# Patient Record
Sex: MALE | Race: WHITE | NOT HISPANIC OR LATINO
[De-identification: names, ages, dates, MRNs, and addresses within clinical notes are randomized per-mention and may not be internally consistent; named-entity substitution may affect disease eponyms.]

---

## 2017-04-12 ENCOUNTER — APPOINTMENT (OUTPATIENT)
Dept: UROLOGY | Facility: CLINIC | Age: 67
End: 2017-04-12

## 2017-04-12 VITALS
TEMPERATURE: 97.6 F | RESPIRATION RATE: 17 BRPM | HEART RATE: 55 BPM | HEIGHT: 69 IN | BODY MASS INDEX: 27.99 KG/M2 | DIASTOLIC BLOOD PRESSURE: 78 MMHG | WEIGHT: 189 LBS | SYSTOLIC BLOOD PRESSURE: 128 MMHG

## 2017-04-18 ENCOUNTER — RX RENEWAL (OUTPATIENT)
Age: 67
End: 2017-04-18

## 2017-04-21 LAB — PSA SERPL-MCNC: 7.86 NG/ML

## 2017-06-13 ENCOUNTER — APPOINTMENT (OUTPATIENT)
Dept: INTERNAL MEDICINE | Facility: CLINIC | Age: 67
End: 2017-06-13

## 2017-06-13 VITALS
HEIGHT: 69 IN | RESPIRATION RATE: 17 BRPM | OXYGEN SATURATION: 96 % | DIASTOLIC BLOOD PRESSURE: 74 MMHG | WEIGHT: 185 LBS | SYSTOLIC BLOOD PRESSURE: 115 MMHG | BODY MASS INDEX: 27.4 KG/M2 | TEMPERATURE: 97.7 F | HEART RATE: 58 BPM

## 2017-06-13 DIAGNOSIS — Z00.00 ENCOUNTER FOR GENERAL ADULT MEDICAL EXAMINATION W/OUT ABNORMAL FINDINGS: ICD-10-CM

## 2017-06-13 DIAGNOSIS — Z82.3 FAMILY HISTORY OF STROKE: ICD-10-CM

## 2017-06-14 LAB
25(OH)D3 SERPL-MCNC: 43.7 NG/ML
ALBUMIN SERPL ELPH-MCNC: 4.6 G/DL
ALP BLD-CCNC: 66 U/L
ALT SERPL-CCNC: 11 U/L
ANION GAP SERPL CALC-SCNC: 15 MMOL/L
APPEARANCE: CLEAR
AST SERPL-CCNC: 16 U/L
BASOPHILS # BLD AUTO: 0.03 K/UL
BASOPHILS NFR BLD AUTO: 0.4 %
BILIRUB SERPL-MCNC: 1.1 MG/DL
BILIRUBIN URINE: NEGATIVE
BLOOD URINE: NEGATIVE
BUN SERPL-MCNC: 28 MG/DL
CALCIUM SERPL-MCNC: 9.3 MG/DL
CHLORIDE SERPL-SCNC: 107 MMOL/L
CHOLEST SERPL-MCNC: 152 MG/DL
CHOLEST/HDLC SERPL: 2 RATIO
CO2 SERPL-SCNC: 22 MMOL/L
COLOR: YELLOW
CREAT SERPL-MCNC: 0.88 MG/DL
EOSINOPHIL # BLD AUTO: 0.3 K/UL
EOSINOPHIL NFR BLD AUTO: 4.3 %
FERRITIN SERPL-MCNC: 80 NG/ML
FOLATE SERPL-MCNC: 18.8 NG/ML
GLUCOSE QUALITATIVE U: NORMAL MG/DL
GLUCOSE SERPL-MCNC: 88 MG/DL
HBA1C MFR BLD HPLC: 5.4 %
HCT VFR BLD CALC: 42.9 %
HCV AB SER QL: NONREACTIVE
HCV S/CO RATIO: 0.18 S/CO
HDLC SERPL-MCNC: 76 MG/DL
HGB BLD-MCNC: 14.7 G/DL
IMM GRANULOCYTES NFR BLD AUTO: 0.1 %
IRON SATN MFR SERPL: 81 %
IRON SERPL-MCNC: 201 UG/DL
KETONES URINE: ABNORMAL
LDLC SERPL CALC-MCNC: 65 MG/DL
LEUKOCYTE ESTERASE URINE: NEGATIVE
LYMPHOCYTES # BLD AUTO: 2.4 K/UL
LYMPHOCYTES NFR BLD AUTO: 34.2 %
MAN DIFF?: NORMAL
MCHC RBC-ENTMCNC: 32.6 PG
MCHC RBC-ENTMCNC: 34.3 GM/DL
MCV RBC AUTO: 95.1 FL
MONOCYTES # BLD AUTO: 0.63 K/UL
MONOCYTES NFR BLD AUTO: 9 %
NEUTROPHILS # BLD AUTO: 3.64 K/UL
NEUTROPHILS NFR BLD AUTO: 52 %
NITRITE URINE: NEGATIVE
PH URINE: 6
PLATELET # BLD AUTO: 213 K/UL
POTASSIUM SERPL-SCNC: 4.2 MMOL/L
PROT SERPL-MCNC: 7 G/DL
PROTEIN URINE: NEGATIVE MG/DL
RBC # BLD: 4.51 M/UL
RBC # FLD: 13.3 %
SODIUM SERPL-SCNC: 144 MMOL/L
SPECIFIC GRAVITY URINE: 1.03
TIBC SERPL-MCNC: 247 UG/DL
TRIGL SERPL-MCNC: 53 MG/DL
TSH SERPL-ACNC: 2.86 UIU/ML
UIBC SERPL-MCNC: 46 UG/DL
UROBILINOGEN URINE: NORMAL MG/DL
VIT B12 SERPL-MCNC: 438 PG/ML
WBC # FLD AUTO: 7.01 K/UL

## 2017-07-05 ENCOUNTER — MESSAGE (OUTPATIENT)
Age: 67
End: 2017-07-05

## 2017-10-16 ENCOUNTER — APPOINTMENT (OUTPATIENT)
Dept: UROLOGY | Facility: CLINIC | Age: 67
End: 2017-10-16

## 2017-12-18 ENCOUNTER — APPOINTMENT (OUTPATIENT)
Dept: INTERNAL MEDICINE | Facility: CLINIC | Age: 67
End: 2017-12-18
Payer: MEDICARE

## 2017-12-18 VITALS
HEIGHT: 69 IN | BODY MASS INDEX: 28.14 KG/M2 | OXYGEN SATURATION: 97 % | TEMPERATURE: 98.3 F | RESPIRATION RATE: 17 BRPM | SYSTOLIC BLOOD PRESSURE: 114 MMHG | WEIGHT: 190 LBS | HEART RATE: 56 BPM | DIASTOLIC BLOOD PRESSURE: 77 MMHG

## 2017-12-18 PROCEDURE — 96372 THER/PROPH/DIAG INJ SC/IM: CPT

## 2017-12-18 PROCEDURE — 99214 OFFICE O/P EST MOD 30 MIN: CPT | Mod: 25

## 2017-12-18 RX ORDER — CYANOCOBALAMIN 1000 UG/ML
1000 INJECTION INTRAMUSCULAR; SUBCUTANEOUS
Qty: 0 | Refills: 0 | Status: COMPLETED | OUTPATIENT
Start: 2017-12-18

## 2017-12-18 RX ADMIN — CYANOCOBALAMIN 0 MCG/ML: 1000 INJECTION INTRAMUSCULAR; SUBCUTANEOUS at 00:00

## 2018-01-31 ENCOUNTER — APPOINTMENT (OUTPATIENT)
Dept: ULTRASOUND IMAGING | Facility: CLINIC | Age: 68
End: 2018-01-31

## 2018-03-27 ENCOUNTER — MESSAGE (OUTPATIENT)
Age: 68
End: 2018-03-27

## 2018-03-28 ENCOUNTER — OTHER (OUTPATIENT)
Age: 68
End: 2018-03-28

## 2018-03-29 LAB
ANION GAP SERPL CALC-SCNC: 12 MMOL/L
BASOPHILS # BLD AUTO: 0.02 K/UL
BASOPHILS NFR BLD AUTO: 0.4 %
BUN SERPL-MCNC: 18 MG/DL
CALCIUM SERPL-MCNC: 9.3 MG/DL
CHLORIDE SERPL-SCNC: 106 MMOL/L
CO2 SERPL-SCNC: 27 MMOL/L
CREAT SERPL-MCNC: 1.02 MG/DL
EOSINOPHIL # BLD AUTO: 0.25 K/UL
EOSINOPHIL NFR BLD AUTO: 4.7 %
GLUCOSE SERPL-MCNC: 148 MG/DL
HCT VFR BLD CALC: 46.7 %
HGB BLD-MCNC: 15.3 G/DL
IMM GRANULOCYTES NFR BLD AUTO: 0.2 %
LYMPHOCYTES # BLD AUTO: 2 K/UL
LYMPHOCYTES NFR BLD AUTO: 37.3 %
MAN DIFF?: NORMAL
MCHC RBC-ENTMCNC: 32.8 GM/DL
MCHC RBC-ENTMCNC: 33.3 PG
MCV RBC AUTO: 101.7 FL
MONOCYTES # BLD AUTO: 0.32 K/UL
MONOCYTES NFR BLD AUTO: 6 %
NEUTROPHILS # BLD AUTO: 2.76 K/UL
NEUTROPHILS NFR BLD AUTO: 51.4 %
PLATELET # BLD AUTO: 226 K/UL
POTASSIUM SERPL-SCNC: 5 MMOL/L
RBC # BLD: 4.59 M/UL
RBC # FLD: 13.7 %
SODIUM SERPL-SCNC: 145 MMOL/L
TSH SERPL-ACNC: 9.82 UIU/ML
VIT B12 SERPL-MCNC: 562 PG/ML
WBC # FLD AUTO: 5.36 K/UL

## 2018-04-04 ENCOUNTER — APPOINTMENT (OUTPATIENT)
Dept: UROLOGY | Facility: CLINIC | Age: 68
End: 2018-04-04

## 2018-04-13 ENCOUNTER — APPOINTMENT (OUTPATIENT)
Dept: INTERNAL MEDICINE | Facility: CLINIC | Age: 68
End: 2018-04-13
Payer: MEDICARE

## 2018-04-13 VITALS
WEIGHT: 191 LBS | DIASTOLIC BLOOD PRESSURE: 76 MMHG | SYSTOLIC BLOOD PRESSURE: 123 MMHG | RESPIRATION RATE: 17 BRPM | HEIGHT: 69 IN | OXYGEN SATURATION: 96 % | BODY MASS INDEX: 28.29 KG/M2 | HEART RATE: 60 BPM | TEMPERATURE: 98.3 F

## 2018-04-13 PROCEDURE — 99214 OFFICE O/P EST MOD 30 MIN: CPT

## 2018-04-16 LAB
ANION GAP SERPL CALC-SCNC: 12 MMOL/L
BUN SERPL-MCNC: 21 MG/DL
CALCIUM SERPL-MCNC: 9.4 MG/DL
CHLORIDE SERPL-SCNC: 106 MMOL/L
CO2 SERPL-SCNC: 26 MMOL/L
CREAT SERPL-MCNC: 0.88 MG/DL
GLUCOSE SERPL-MCNC: 88 MG/DL
HBA1C MFR BLD HPLC: 5.4 %
POTASSIUM SERPL-SCNC: 4.3 MMOL/L
SODIUM SERPL-SCNC: 144 MMOL/L
TSH SERPL-ACNC: 4.46 UIU/ML

## 2018-04-22 ENCOUNTER — FORM ENCOUNTER (OUTPATIENT)
Age: 68
End: 2018-04-22

## 2018-04-23 ENCOUNTER — OUTPATIENT (OUTPATIENT)
Dept: OUTPATIENT SERVICES | Facility: HOSPITAL | Age: 68
LOS: 1 days | End: 2018-04-23
Payer: MEDICARE

## 2018-04-23 ENCOUNTER — APPOINTMENT (OUTPATIENT)
Dept: ULTRASOUND IMAGING | Facility: CLINIC | Age: 68
End: 2018-04-23
Payer: MEDICARE

## 2018-04-23 DIAGNOSIS — Z00.8 ENCOUNTER FOR OTHER GENERAL EXAMINATION: ICD-10-CM

## 2018-04-23 PROCEDURE — 76700 US EXAM ABDOM COMPLETE: CPT | Mod: 26

## 2018-04-23 PROCEDURE — 76700 US EXAM ABDOM COMPLETE: CPT

## 2018-06-13 ENCOUNTER — RX RENEWAL (OUTPATIENT)
Age: 68
End: 2018-06-13

## 2018-07-02 ENCOUNTER — APPOINTMENT (OUTPATIENT)
Dept: INTERNAL MEDICINE | Facility: CLINIC | Age: 68
End: 2018-07-02

## 2018-11-09 ENCOUNTER — MESSAGE (OUTPATIENT)
Age: 68
End: 2018-11-09

## 2018-11-12 ENCOUNTER — APPOINTMENT (OUTPATIENT)
Dept: UROLOGY | Facility: CLINIC | Age: 68
End: 2018-11-12

## 2018-12-13 LAB
PSA FREE FLD-MCNC: 12 %
PSA FREE SERPL-MCNC: 1.15 NG/ML
PSA SERPL-MCNC: 9.46 NG/ML

## 2018-12-19 ENCOUNTER — APPOINTMENT (OUTPATIENT)
Dept: INTERNAL MEDICINE | Facility: CLINIC | Age: 68
End: 2018-12-19

## 2018-12-20 ENCOUNTER — APPOINTMENT (OUTPATIENT)
Dept: UROLOGY | Facility: CLINIC | Age: 68
End: 2018-12-20
Payer: MEDICARE

## 2018-12-20 VITALS
WEIGHT: 191 LBS | DIASTOLIC BLOOD PRESSURE: 68 MMHG | SYSTOLIC BLOOD PRESSURE: 156 MMHG | TEMPERATURE: 97.9 F | BODY MASS INDEX: 28.29 KG/M2 | RESPIRATION RATE: 17 BRPM | HEIGHT: 69 IN | HEART RATE: 72 BPM

## 2018-12-20 PROCEDURE — 51798 US URINE CAPACITY MEASURE: CPT | Mod: 59

## 2018-12-20 PROCEDURE — 99213 OFFICE O/P EST LOW 20 MIN: CPT | Mod: 25

## 2018-12-20 PROCEDURE — 51741 ELECTRO-UROFLOWMETRY FIRST: CPT

## 2019-01-03 ENCOUNTER — FORM ENCOUNTER (OUTPATIENT)
Age: 69
End: 2019-01-03

## 2019-01-04 ENCOUNTER — OUTPATIENT (OUTPATIENT)
Dept: OUTPATIENT SERVICES | Facility: HOSPITAL | Age: 69
LOS: 1 days | End: 2019-01-04
Payer: MEDICARE

## 2019-01-04 ENCOUNTER — APPOINTMENT (OUTPATIENT)
Dept: MRI IMAGING | Facility: IMAGING CENTER | Age: 69
End: 2019-01-04
Payer: MEDICARE

## 2019-01-04 DIAGNOSIS — R97.20 ELEVATED PROSTATE SPECIFIC ANTIGEN [PSA]: ICD-10-CM

## 2019-01-04 PROCEDURE — 82565 ASSAY OF CREATININE: CPT

## 2019-01-04 PROCEDURE — 72197 MRI PELVIS W/O & W/DYE: CPT | Mod: 26

## 2019-01-04 PROCEDURE — 72197 MRI PELVIS W/O & W/DYE: CPT

## 2019-01-04 PROCEDURE — A9585: CPT

## 2019-01-09 ENCOUNTER — APPOINTMENT (OUTPATIENT)
Dept: UROLOGY | Facility: CLINIC | Age: 69
End: 2019-01-09
Payer: MEDICARE

## 2019-01-09 PROCEDURE — 99213 OFFICE O/P EST LOW 20 MIN: CPT

## 2019-01-09 NOTE — HISTORY OF PRESENT ILLNESS
[FreeTextEntry1] : 68 year old male presents for evaluation of test results.\par This pt has been on Flomax for many years . \par Nocturia x 1-2 \par Stream is weak \par Occasional intermittency \par He had Bx in the past, none of which were targeted.\par Uroflow 12/13/18: Voided volume of 55ml and PVR of 140ml. Peak flow of 7.1\par Repeat PSA marked increase in 2 years.\par PSA is now 9.46, last result from 12/13/18. \par MRI on 01/04/19 reported prostate volume of 95.7 cc and PIRADS-3.\par We will schedule pt for targeted prostate Bx. \par We will do a residual on him after his prostate Bx due to previous residual of 140 mL.

## 2019-01-09 NOTE — ASSESSMENT
[FreeTextEntry1] : PSA is now 9.46, last result from 12/13/18. \par MRI on 01/04/19 reported prostate volume of 95.7 cc and PIRADS-3.\par We will schedule pt for targeted prostate Bx. \par We will do a residual on him after his prostate Bx due to previous residual of 140 mL.

## 2019-01-09 NOTE — ADDENDUM
[FreeTextEntry1] :  I, Mitzi Archer, acted solely as a scribe for Dr. Joaquín Steve. The documentation recorded by the scribe accurately reflects the service I personally performed and the decision by me.\par

## 2019-01-15 ENCOUNTER — APPOINTMENT (OUTPATIENT)
Dept: INTERNAL MEDICINE | Facility: CLINIC | Age: 69
End: 2019-01-15
Payer: MEDICARE

## 2019-01-15 VITALS
WEIGHT: 190 LBS | RESPIRATION RATE: 17 BRPM | TEMPERATURE: 98.6 F | HEART RATE: 61 BPM | DIASTOLIC BLOOD PRESSURE: 73 MMHG | OXYGEN SATURATION: 97 % | HEIGHT: 69 IN | BODY MASS INDEX: 28.14 KG/M2 | SYSTOLIC BLOOD PRESSURE: 113 MMHG

## 2019-01-15 DIAGNOSIS — R79.0 ABNORMAL LVL OF BLOOD MINERAL: ICD-10-CM

## 2019-01-15 DIAGNOSIS — R22.9 LOCALIZED SWELLING, MASS AND LUMP, UNSPECIFIED: ICD-10-CM

## 2019-01-15 DIAGNOSIS — R61 GENERALIZED HYPERHIDROSIS: ICD-10-CM

## 2019-01-15 DIAGNOSIS — D51.0 VITAMIN B12 DEFICIENCY ANEMIA DUE TO INTRINSIC FACTOR DEFICIENCY: ICD-10-CM

## 2019-01-15 DIAGNOSIS — E04.1 NONTOXIC SINGLE THYROID NODULE: ICD-10-CM

## 2019-01-15 DIAGNOSIS — R73.09 OTHER ABNORMAL GLUCOSE: ICD-10-CM

## 2019-01-15 DIAGNOSIS — R07.89 OTHER CHEST PAIN: ICD-10-CM

## 2019-01-15 DIAGNOSIS — R42 DIZZINESS AND GIDDINESS: ICD-10-CM

## 2019-01-15 PROCEDURE — 99214 OFFICE O/P EST MOD 30 MIN: CPT

## 2019-01-16 ENCOUNTER — APPOINTMENT (OUTPATIENT)
Dept: SURGERY | Facility: CLINIC | Age: 69
End: 2019-01-16
Payer: MEDICARE

## 2019-01-16 VITALS
BODY MASS INDEX: 28.14 KG/M2 | DIASTOLIC BLOOD PRESSURE: 57 MMHG | HEIGHT: 69 IN | WEIGHT: 190 LBS | TEMPERATURE: 98.2 F | HEART RATE: 75 BPM | OXYGEN SATURATION: 97 % | SYSTOLIC BLOOD PRESSURE: 111 MMHG

## 2019-01-16 PROCEDURE — 99201 OFFICE OUTPATIENT NEW 10 MINUTES: CPT

## 2019-01-16 PROCEDURE — 10060 I&D ABSCESS SIMPLE/SINGLE: CPT

## 2019-01-16 NOTE — ASSESSMENT
[FreeTextEntry1] : 69 yo male  with infected sebaceous cyst. I&D performed and wound packed.\par \par instruction given. Patient encouraged to f/u in 3-4 weeks for removal of cyst when it reforms.

## 2019-01-16 NOTE — HISTORY OF PRESENT ILLNESS
[de-identified] : 67 yo male was seen yesterday by his PMD for general f/u and was noted to have a large infected cyst on his right lateral upper back. He states he has this once before in the past and has drained it on his own. He currently denies any fevers or chills. He states he was given antibiotics yesterday. He was told to see us today for I&D.

## 2019-01-16 NOTE — PHYSICAL EXAM
[Respiratory Effort] : normal respiratory effort [Alert] : alert [Oriented to Person] : oriented to person [Oriented to Place] : oriented to place [Oriented to Time] : oriented to time [Calm] : calm [JVD] : no jugular venous distention  [de-identified] : ELIANE CALERO NAD [de-identified] : EOMI [de-identified] : + 4x4cm cyst of right lateral upper back, tender to palpation with erythema. + fluctuance

## 2019-01-28 ENCOUNTER — MEDICATION RENEWAL (OUTPATIENT)
Age: 69
End: 2019-01-28

## 2019-01-29 ENCOUNTER — APPOINTMENT (OUTPATIENT)
Dept: UROLOGY | Facility: CLINIC | Age: 69
End: 2019-01-29

## 2019-01-30 ENCOUNTER — MEDICATION RENEWAL (OUTPATIENT)
Age: 69
End: 2019-01-30

## 2019-02-01 ENCOUNTER — APPOINTMENT (OUTPATIENT)
Dept: INTERNAL MEDICINE | Facility: CLINIC | Age: 69
End: 2019-02-01

## 2019-02-04 ENCOUNTER — APPOINTMENT (OUTPATIENT)
Dept: INTERNAL MEDICINE | Facility: CLINIC | Age: 69
End: 2019-02-04

## 2019-02-05 ENCOUNTER — APPOINTMENT (OUTPATIENT)
Dept: UROLOGY | Facility: CLINIC | Age: 69
End: 2019-02-05
Payer: MEDICARE

## 2019-02-05 ENCOUNTER — OUTPATIENT (OUTPATIENT)
Dept: OUTPATIENT SERVICES | Facility: HOSPITAL | Age: 69
LOS: 1 days | End: 2019-02-05
Payer: MEDICARE

## 2019-02-05 VITALS
TEMPERATURE: 98 F | SYSTOLIC BLOOD PRESSURE: 113 MMHG | HEIGHT: 69 IN | RESPIRATION RATE: 17 BRPM | WEIGHT: 190 LBS | BODY MASS INDEX: 28.14 KG/M2 | DIASTOLIC BLOOD PRESSURE: 70 MMHG | HEART RATE: 54 BPM

## 2019-02-05 DIAGNOSIS — R35.0 FREQUENCY OF MICTURITION: ICD-10-CM

## 2019-02-05 PROCEDURE — 76872 US TRANSRECTAL: CPT | Mod: 26

## 2019-02-05 PROCEDURE — 55700: CPT

## 2019-02-05 PROCEDURE — 76942 ECHO GUIDE FOR BIOPSY: CPT | Mod: 26,59

## 2019-02-05 PROCEDURE — 76942 ECHO GUIDE FOR BIOPSY: CPT | Mod: 59

## 2019-02-05 PROCEDURE — 76872 US TRANSRECTAL: CPT

## 2019-02-05 PROCEDURE — 88305 TISSUE EXAM BY PATHOLOGIST: CPT | Mod: 26

## 2019-02-05 RX ORDER — AMIKACIN SULFATE 250 MG/ML
500 INJECTION, SOLUTION INTRAMUSCULAR; INTRAVENOUS
Qty: 2 | Refills: 0 | Status: COMPLETED | OUTPATIENT
Start: 2019-01-28 | End: 2019-02-05

## 2019-02-05 RX ORDER — AMIKACIN SULFATE 250 MG/ML
500 INJECTION, SOLUTION INTRAMUSCULAR; INTRAVENOUS
Refills: 0 | Status: COMPLETED | OUTPATIENT
Start: 2019-02-05

## 2019-02-05 RX ADMIN — AMIKACIN SULFATE 0 MG/2ML: 500 INJECTION, SOLUTION INTRAMUSCULAR; INTRAVENOUS at 00:00

## 2019-02-05 NOTE — PHYSICAL EXAM
[General Appearance - Well Developed] : well developed [General Appearance - Well Nourished] : well nourished [Normal Appearance] : normal appearance [Well Groomed] : well groomed [General Appearance - In No Acute Distress] : no acute distress [Abdomen Soft] : soft [Abdomen Tenderness] : non-tender [Costovertebral Angle Tenderness] : no ~M costovertebral angle tenderness [Edema] : no peripheral edema [] : no respiratory distress [Respiration, Rhythm And Depth] : normal respiratory rhythm and effort [Exaggerated Use Of Accessory Muscles For Inspiration] : no accessory muscle use [Oriented To Time, Place, And Person] : oriented to person, place, and time [Affect] : the affect was normal [Mood] : the mood was normal [Not Anxious] : not anxious [Normal Station and Gait] : the gait and station were normal for the patient's age [No Focal Deficits] : no focal deficits [No Palpable Adenopathy] : no palpable adenopathy [Skin Color & Pigmentation] : normal skin color and pigmentation [Skin Turgor] : supple [Heart Rate And Rhythm] : Heart rate and rhythm were normal [Cervical Lymph Nodes Enlarged Posterior Bilaterally] : posterior cervical [Cervical Lymph Nodes Enlarged Anterior Bilaterally] : anterior cervical [Supraclavicular Lymph Nodes Enlarged Bilaterally] : supraclavicular

## 2019-02-11 ENCOUNTER — APPOINTMENT (OUTPATIENT)
Dept: UROLOGY | Facility: CLINIC | Age: 69
End: 2019-02-11
Payer: MEDICARE

## 2019-02-11 PROCEDURE — 99214 OFFICE O/P EST MOD 30 MIN: CPT | Mod: 25

## 2019-02-11 PROCEDURE — 51798 US URINE CAPACITY MEASURE: CPT | Mod: 59

## 2019-02-11 PROCEDURE — 51741 ELECTRO-UROFLOWMETRY FIRST: CPT

## 2019-02-11 NOTE — HISTORY OF PRESENT ILLNESS
[FreeTextEntry1] : 68 year old male presents for evaluation of test results.\par Elevated PSA of 9.46 on 12/13/18.\par MRI 01/04/19 showed prostate volume of 95.7 cc and a suspicious lesion at the left peripheral zone base PIRADS-3.\par Targeted NBP 02/05/19 was benign. \par Uroflow study showed a peak flow of 8.5 ml/s and voided volume of 378 mL. PVR today was 222 mL\par Pt voided a 2nd time and his PVR was 179 mL\par I had a long consultation with the pt discussing the risks and benefits of various prostatic procedures.\par Scheduled for ThuLEP.\par Pt.may have a bladder diverticulum on US.

## 2019-02-11 NOTE — ASSESSMENT
[FreeTextEntry1] : Elevated PSA of 9.46 in Dec 2018\par Targeted NBP 02/05/19 was benign. \par Uroflow study showed a peak flow of 8.5 ml/s and voided volume of 378 mL. PVR today was 222 mL\par I have suggested ThuLEP with the pt.\par Scheduled for ThuLEP.

## 2019-02-12 LAB — CORE LAB BIOPSY: NORMAL

## 2019-02-15 ENCOUNTER — APPOINTMENT (OUTPATIENT)
Dept: INTERNAL MEDICINE | Facility: CLINIC | Age: 69
End: 2019-02-15
Payer: MEDICARE

## 2019-02-15 ENCOUNTER — LABORATORY RESULT (OUTPATIENT)
Age: 69
End: 2019-02-15

## 2019-02-15 VITALS
DIASTOLIC BLOOD PRESSURE: 81 MMHG | SYSTOLIC BLOOD PRESSURE: 122 MMHG | HEIGHT: 69 IN | RESPIRATION RATE: 17 BRPM | BODY MASS INDEX: 28.58 KG/M2 | OXYGEN SATURATION: 97 % | HEART RATE: 72 BPM | TEMPERATURE: 98.6 F | WEIGHT: 193 LBS

## 2019-02-15 DIAGNOSIS — Z00.00 ENCOUNTER FOR GENERAL ADULT MEDICAL EXAMINATION W/OUT ABNORMAL FINDINGS: ICD-10-CM

## 2019-02-15 PROCEDURE — G0439: CPT

## 2019-02-15 RX ORDER — MOXIFLOXACIN HYDROCHLORIDE TABLETS, 400 MG 400 MG/1
400 TABLET, FILM COATED ORAL DAILY
Qty: 10 | Refills: 0 | Status: DISCONTINUED | COMMUNITY
Start: 2019-01-15 | End: 2019-02-15

## 2019-02-15 RX ORDER — DIAZEPAM 5 MG/1
5 TABLET ORAL
Qty: 3 | Refills: 0 | Status: DISCONTINUED | COMMUNITY
Start: 2019-01-25 | End: 2019-02-15

## 2019-02-15 RX ORDER — CEFDINIR 300 MG/1
300 CAPSULE ORAL
Qty: 4 | Refills: 0 | Status: DISCONTINUED | COMMUNITY
Start: 2019-01-25 | End: 2019-02-15

## 2019-02-18 ENCOUNTER — MESSAGE (OUTPATIENT)
Age: 69
End: 2019-02-18

## 2019-02-18 LAB
25(OH)D3 SERPL-MCNC: 34.8 NG/ML
ALBUMIN SERPL ELPH-MCNC: 4.8 G/DL
ALP BLD-CCNC: 67 U/L
ALT SERPL-CCNC: 21 U/L
ANION GAP SERPL CALC-SCNC: 10 MMOL/L
APPEARANCE: ABNORMAL
AST SERPL-CCNC: 20 U/L
BASOPHILS # BLD AUTO: 0.05 K/UL
BASOPHILS NFR BLD AUTO: 0.8 %
BILIRUB SERPL-MCNC: 0.8 MG/DL
BILIRUBIN URINE: NEGATIVE
BLOOD URINE: ABNORMAL
BUN SERPL-MCNC: 17 MG/DL
CALCIUM SERPL-MCNC: 9.8 MG/DL
CHLORIDE SERPL-SCNC: 106 MMOL/L
CHOLEST SERPL-MCNC: 160 MG/DL
CHOLEST/HDLC SERPL: 2.3 RATIO
CO2 SERPL-SCNC: 27 MMOL/L
COLOR: YELLOW
CREAT SERPL-MCNC: 0.94 MG/DL
EOSINOPHIL # BLD AUTO: 0.3 K/UL
EOSINOPHIL NFR BLD AUTO: 4.8 %
FERRITIN SERPL-MCNC: 60 NG/ML
GLUCOSE QUALITATIVE U: NEGATIVE MG/DL
GLUCOSE SERPL-MCNC: 96 MG/DL
HBA1C MFR BLD HPLC: 5.5 %
HCT VFR BLD CALC: 48.6 %
HCV AB SER QL: NONREACTIVE
HCV S/CO RATIO: 0.18 S/CO
HDLC SERPL-MCNC: 69 MG/DL
HGB BLD-MCNC: 16.1 G/DL
IMM GRANULOCYTES NFR BLD AUTO: 0.2 %
IRON SATN MFR SERPL: 70 %
IRON SERPL-MCNC: 205 UG/DL
KETONES URINE: NEGATIVE
LDLC SERPL CALC-MCNC: 74 MG/DL
LEUKOCYTE ESTERASE URINE: ABNORMAL
LYMPHOCYTES # BLD AUTO: 2.31 K/UL
LYMPHOCYTES NFR BLD AUTO: 36.7 %
MAGNESIUM SERPL-MCNC: 2.2 MG/DL
MAN DIFF?: NORMAL
MCHC RBC-ENTMCNC: 32.6 PG
MCHC RBC-ENTMCNC: 33.1 GM/DL
MCV RBC AUTO: 98.4 FL
MONOCYTES # BLD AUTO: 0.55 K/UL
MONOCYTES NFR BLD AUTO: 8.7 %
NEUTROPHILS # BLD AUTO: 3.07 K/UL
NEUTROPHILS NFR BLD AUTO: 48.8 %
NITRITE URINE: NEGATIVE
PH URINE: 7.5
PLATELET # BLD AUTO: 273 K/UL
POTASSIUM SERPL-SCNC: 4.6 MMOL/L
PROT SERPL-MCNC: 7.2 G/DL
PROTEIN URINE: ABNORMAL MG/DL
RBC # BLD: 4.94 M/UL
RBC # FLD: 13.8 %
SODIUM SERPL-SCNC: 143 MMOL/L
SPECIFIC GRAVITY URINE: 1.02
TIBC SERPL-MCNC: 294 UG/DL
TRIGL SERPL-MCNC: 84 MG/DL
TSH SERPL-ACNC: 5.73 UIU/ML
UIBC SERPL-MCNC: 89 UG/DL
UROBILINOGEN URINE: NEGATIVE MG/DL
VIT B12 SERPL-MCNC: 845 PG/ML
WBC # FLD AUTO: 6.29 K/UL

## 2019-02-18 NOTE — HISTORY OF PRESENT ILLNESS
[FreeTextEntry1] : 68 year old male presents for evaluation of test results.\par This pt has been on Flomax for many years . \par Nocturia x 1-2 \par Stream is weak \par Occasional intermittency \par He had Bx in the past, none of which were targeted.\par Uroflow 12/13/18: Voided volume of 55ml and PVR of 140ml. Peak flow of 7.1\par Repeat PSA marked increase in 2 years.\par PSA is now 9.46, last result from 12/13/18. \par MRI on 01/04/19 reported prostate volume of 95.7 cc and PIRADS-3.\par We will schedule pt for targeted prostate Bx. \par We will do a residual on him after his prostate Bx due to previous residual of 140 mL. \par \par 2/5/19: The patient returned today for an MRI/ultrasound fusion guided biopsy of the prostate. Was administered an Amikacin injection. Took Valium and Diazepam as directed. Last biopsy was years ago. Notes his urination is adequate. Has been taking Tamsulosin once daily. Inquired about an operation on the prostate. I advised the patient about his options of a laser enucleation or trying other medication. I discouraged the patient from considering a green light laser as the patient's prostate is larger than most that would undergo this procedure. We discussed the risks and benefits of both, as the patient will decide which route he wants to take in the future. Retired.

## 2019-02-18 NOTE — ADDENDUM
[FreeTextEntry1] : This note was authored by Kaity Long working as a scribe for Dr. Tiburcio Decker.\par I, Dr. Tiburcio Decker, have reviewed the content of this note and confirm it is true and accurate. I personally performed the history and physical examination and made all the decisions.\par 2/5/19\par

## 2019-02-18 NOTE — ASSESSMENT
[FreeTextEntry1] : 68 year old male presents for evaluation of test results.\par This pt has been on Flomax for many years . \par Nocturia x 1-2 \par Stream is weak \par Occasional intermittency \par He had Bx in the past, none of which were targeted.\par Uroflow 12/13/18: Voided volume of 55ml and PVR of 140ml. Peak flow of 7.1\par Repeat PSA marked increase in 2 years.\par PSA is now 9.46, last result from 12/13/18. \par MRI on 01/04/19 reported prostate volume of 95.7 cc and PIRADS-3.\par We will schedule pt for targeted prostate Bx. \par We will do a residual on him after his prostate Bx due to previous residual of 140 mL. \par \par 2/5/19: The patient returned today for an MRI/ultrasound fusion guided biopsy of the prostate. Was administered an Amikacin injection. Took Valium and Diazepam as directed. Last biopsy was years ago. Notes his urination is adequate. Has been taking Tamsulosin once daily. Inquired about an operation on the prostate. I advised the patient about his options of a laser enucleation or trying other medication. I discouraged the patient from considering a green light laser as the patient's prostate is larger than most that would undergo this procedure. We discussed the risks and benefits of both, as the patient will decide which route he wants to take in the future. Retired.\par \par Biopsy: I parted the butt cheeks. Placed the probe in the rectum. Lidocaine jelly used. Measured the prostate volume. Swept from bladder down to the perineum. During the biopsy, there were technical difficulties. After some time, the technical difficulties were fixed and we continued with the procedure. We took four biopsies from the targeted lesion as well as other sampling biopsies. We will send the biopsies to pathology.\par I advised the patient to drink lots of water and that he should expect blood from his urine and stool, but it will resolve.\par \par The patient will return in one week for a follow up with Dr. Steve to discuss prostate biopsy results.

## 2019-02-20 DIAGNOSIS — R97.20 ELEVATED PROSTATE SPECIFIC ANTIGEN [PSA]: ICD-10-CM

## 2019-02-20 DIAGNOSIS — R93.5 ABNORMAL FINDINGS ON DIAGNOSTIC IMAGING OF OTHER ABDOMINAL REGIONS, INCLUDING RETROPERITONEUM: ICD-10-CM

## 2019-02-26 ENCOUNTER — MESSAGE (OUTPATIENT)
Age: 69
End: 2019-02-26

## 2019-03-26 ENCOUNTER — OUTPATIENT (OUTPATIENT)
Dept: OUTPATIENT SERVICES | Facility: HOSPITAL | Age: 69
LOS: 1 days | End: 2019-03-26
Payer: MEDICARE

## 2019-03-26 VITALS
DIASTOLIC BLOOD PRESSURE: 77 MMHG | WEIGHT: 192.9 LBS | SYSTOLIC BLOOD PRESSURE: 127 MMHG | OXYGEN SATURATION: 98 % | RESPIRATION RATE: 16 BRPM | HEART RATE: 62 BPM | HEIGHT: 69 IN | TEMPERATURE: 98 F

## 2019-03-26 DIAGNOSIS — Z01.818 ENCOUNTER FOR OTHER PREPROCEDURAL EXAMINATION: ICD-10-CM

## 2019-03-26 DIAGNOSIS — Z98.890 OTHER SPECIFIED POSTPROCEDURAL STATES: Chronic | ICD-10-CM

## 2019-03-26 DIAGNOSIS — N40.0 BENIGN PROSTATIC HYPERPLASIA WITHOUT LOWER URINARY TRACT SYMPTOMS: ICD-10-CM

## 2019-03-26 DIAGNOSIS — N40.1 BENIGN PROSTATIC HYPERPLASIA WITH LOWER URINARY TRACT SYMPTOMS: ICD-10-CM

## 2019-03-26 LAB
BLD GP AB SCN SERPL QL: NEGATIVE — SIGNIFICANT CHANGE UP
RH IG SCN BLD-IMP: POSITIVE — SIGNIFICANT CHANGE UP

## 2019-03-26 PROCEDURE — 86901 BLOOD TYPING SEROLOGIC RH(D): CPT

## 2019-03-26 PROCEDURE — G0463: CPT

## 2019-03-26 PROCEDURE — 86900 BLOOD TYPING SEROLOGIC ABO: CPT

## 2019-03-26 PROCEDURE — 87086 URINE CULTURE/COLONY COUNT: CPT

## 2019-03-26 PROCEDURE — 86850 RBC ANTIBODY SCREEN: CPT

## 2019-03-26 RX ORDER — SODIUM CHLORIDE 9 MG/ML
3 INJECTION INTRAMUSCULAR; INTRAVENOUS; SUBCUTANEOUS EVERY 8 HOURS
Refills: 0 | Status: DISCONTINUED | OUTPATIENT
Start: 2019-04-02 | End: 2019-04-02

## 2019-03-26 RX ORDER — CEFAZOLIN SODIUM 1 G
2000 VIAL (EA) INJECTION ONCE
Refills: 0 | Status: DISCONTINUED | OUTPATIENT
Start: 2019-04-02 | End: 2019-04-02

## 2019-03-26 RX ORDER — LIDOCAINE HCL 20 MG/ML
0.2 VIAL (ML) INJECTION ONCE
Refills: 0 | Status: COMPLETED | OUTPATIENT
Start: 2019-04-02 | End: 2019-04-02

## 2019-03-26 NOTE — H&P PST ADULT - RS GEN PE MLT RESP DETAILS PC
breath sounds equal/good air movement/respirations non-labored/clear to auscultation bilaterally/no chest wall tenderness

## 2019-03-26 NOTE — H&P PST ADULT - NSICDXPROBLEM_GEN_ALL_CORE_FT
PROBLEM DIAGNOSES  Problem: BPH with elevated PSA  Assessment and Plan: Thulium Enucleation of Prostate Check for Bladder Diverticulum   Pre-op education provided - all questions answered

## 2019-03-26 NOTE — H&P PST ADULT - NSICDXPASTMEDICALHX_GEN_ALL_CORE_FT
PAST MEDICAL HISTORY:  BPH with elevated PSA     [place] (hard of hearing) Joon hearing aids    Hypothyroid

## 2019-03-26 NOTE — H&P PST ADULT - NSANTHOSAYNRD_GEN_A_CORE
No. OZZY screening performed.  STOP BANG Legend: 0-2 = LOW Risk; 3-4 = INTERMEDIATE Risk; 5-8 = HIGH Risk

## 2019-03-26 NOTE — H&P PST ADULT - NEUROLOGICAL DETAILS
alert and oriented x 3/responds to verbal commands/sensation intact/no spontaneous movement/normal strength

## 2019-03-26 NOTE — H&P PST ADULT - ABILITY TO HEAR (WITH HEARING AID OR HEARING APPLIANCE IF NORMALLY USED):
[employer]oon hearing aids/Mildly to Moderately Impaired: difficulty hearing in some environments or speaker may need to increase volume or speak distinctly

## 2019-03-26 NOTE — H&P PST ADULT - NEGATIVE GENERAL GENITOURINARY SYMPTOMS
no renal colic/no flank pain L/no flank pain R/no urine discoloration/no bladder infections/no incontinence

## 2019-03-26 NOTE — H&P PST ADULT - HISTORY OF PRESENT ILLNESS
69 y/o male H/O Hypothyroidism, BPH with elevated PSA C/O nocturia, urinary  hesitancy and frequency x 12 month. Now scheduled for Thulium Enucleation of Prostate Check for Bladder Diverticulum on 4/2/19. Denies any palpitations, SOB, N/V, fever or chills.

## 2019-03-27 LAB
CULTURE RESULTS: SIGNIFICANT CHANGE UP
SPECIMEN SOURCE: SIGNIFICANT CHANGE UP

## 2019-04-01 ENCOUNTER — TRANSCRIPTION ENCOUNTER (OUTPATIENT)
Age: 69
End: 2019-04-01

## 2019-04-02 ENCOUNTER — OUTPATIENT (OUTPATIENT)
Dept: OUTPATIENT SERVICES | Facility: HOSPITAL | Age: 69
LOS: 1 days | End: 2019-04-02
Payer: MEDICARE

## 2019-04-02 ENCOUNTER — APPOINTMENT (OUTPATIENT)
Dept: UROLOGY | Facility: HOSPITAL | Age: 69
End: 2019-04-02

## 2019-04-02 ENCOUNTER — RESULT REVIEW (OUTPATIENT)
Age: 69
End: 2019-04-02

## 2019-04-02 VITALS
HEART RATE: 60 BPM | SYSTOLIC BLOOD PRESSURE: 112 MMHG | DIASTOLIC BLOOD PRESSURE: 74 MMHG | RESPIRATION RATE: 18 BRPM | TEMPERATURE: 98 F | OXYGEN SATURATION: 97 % | WEIGHT: 193.35 LBS | HEIGHT: 69 IN

## 2019-04-02 DIAGNOSIS — Z98.890 OTHER SPECIFIED POSTPROCEDURAL STATES: Chronic | ICD-10-CM

## 2019-04-02 DIAGNOSIS — N40.1 BENIGN PROSTATIC HYPERPLASIA WITH LOWER URINARY TRACT SYMPTOMS: ICD-10-CM

## 2019-04-02 LAB
ANION GAP SERPL CALC-SCNC: 9 MMOL/L — SIGNIFICANT CHANGE UP (ref 5–17)
BUN SERPL-MCNC: 17 MG/DL — SIGNIFICANT CHANGE UP (ref 7–23)
CALCIUM SERPL-MCNC: 8.1 MG/DL — LOW (ref 8.4–10.5)
CHLORIDE SERPL-SCNC: 106 MMOL/L — SIGNIFICANT CHANGE UP (ref 96–108)
CO2 SERPL-SCNC: 25 MMOL/L — SIGNIFICANT CHANGE UP (ref 22–31)
CREAT SERPL-MCNC: 0.85 MG/DL — SIGNIFICANT CHANGE UP (ref 0.5–1.3)
GLUCOSE SERPL-MCNC: 185 MG/DL — HIGH (ref 70–99)
HCT VFR BLD CALC: 42.5 % — SIGNIFICANT CHANGE UP (ref 39–50)
HGB BLD-MCNC: 15 G/DL — SIGNIFICANT CHANGE UP (ref 13–17)
MCHC RBC-ENTMCNC: 34.4 PG — HIGH (ref 27–34)
MCHC RBC-ENTMCNC: 35.4 GM/DL — SIGNIFICANT CHANGE UP (ref 32–36)
MCV RBC AUTO: 97.3 FL — SIGNIFICANT CHANGE UP (ref 80–100)
PLATELET # BLD AUTO: 223 K/UL — SIGNIFICANT CHANGE UP (ref 150–400)
POTASSIUM SERPL-MCNC: 3.9 MMOL/L — SIGNIFICANT CHANGE UP (ref 3.5–5.3)
POTASSIUM SERPL-SCNC: 3.9 MMOL/L — SIGNIFICANT CHANGE UP (ref 3.5–5.3)
RBC # BLD: 4.37 M/UL — SIGNIFICANT CHANGE UP (ref 4.2–5.8)
RBC # FLD: 12.6 % — SIGNIFICANT CHANGE UP (ref 10.3–14.5)
RH IG SCN BLD-IMP: POSITIVE — SIGNIFICANT CHANGE UP
SODIUM SERPL-SCNC: 140 MMOL/L — SIGNIFICANT CHANGE UP (ref 135–145)
WBC # BLD: 13.9 K/UL — HIGH (ref 3.8–10.5)
WBC # FLD AUTO: 13.9 K/UL — HIGH (ref 3.8–10.5)

## 2019-04-02 PROCEDURE — 52649 PROSTATE LASER ENUCLEATION: CPT

## 2019-04-02 PROCEDURE — 52305 CYSTOSCOPY AND TREATMENT: CPT | Mod: 59

## 2019-04-02 PROCEDURE — 88305 TISSUE EXAM BY PATHOLOGIST: CPT | Mod: 26

## 2019-04-02 RX ORDER — CEFAZOLIN SODIUM 1 G
2000 VIAL (EA) INJECTION EVERY 8 HOURS
Refills: 0 | Status: DISCONTINUED | OUTPATIENT
Start: 2019-04-02 | End: 2019-04-17

## 2019-04-02 RX ORDER — SENNA PLUS 8.6 MG/1
2 TABLET ORAL AT BEDTIME
Refills: 0 | Status: DISCONTINUED | OUTPATIENT
Start: 2019-04-02 | End: 2019-04-17

## 2019-04-02 RX ORDER — HEPARIN SODIUM 5000 [USP'U]/ML
5000 INJECTION INTRAVENOUS; SUBCUTANEOUS EVERY 8 HOURS
Refills: 0 | Status: DISCONTINUED | OUTPATIENT
Start: 2019-04-02 | End: 2019-04-17

## 2019-04-02 RX ORDER — ONDANSETRON 8 MG/1
4 TABLET, FILM COATED ORAL ONCE
Refills: 0 | Status: COMPLETED | OUTPATIENT
Start: 2019-04-02 | End: 2019-04-02

## 2019-04-02 RX ORDER — HYDROMORPHONE HYDROCHLORIDE 2 MG/ML
0.5 INJECTION INTRAMUSCULAR; INTRAVENOUS; SUBCUTANEOUS
Refills: 0 | Status: DISCONTINUED | OUTPATIENT
Start: 2019-04-02 | End: 2019-04-03

## 2019-04-02 RX ORDER — OXYCODONE HYDROCHLORIDE 5 MG/1
10 TABLET ORAL EVERY 4 HOURS
Refills: 0 | Status: DISCONTINUED | OUTPATIENT
Start: 2019-04-02 | End: 2019-04-02

## 2019-04-02 RX ORDER — DOCUSATE SODIUM 100 MG
100 CAPSULE ORAL THREE TIMES A DAY
Refills: 0 | Status: DISCONTINUED | OUTPATIENT
Start: 2019-04-02 | End: 2019-04-17

## 2019-04-02 RX ORDER — ACETAMINOPHEN 500 MG
650 TABLET ORAL EVERY 6 HOURS
Refills: 0 | Status: DISCONTINUED | OUTPATIENT
Start: 2019-04-02 | End: 2019-04-17

## 2019-04-02 RX ORDER — LEVOTHYROXINE SODIUM 125 MCG
137 TABLET ORAL DAILY
Refills: 0 | Status: DISCONTINUED | OUTPATIENT
Start: 2019-04-02 | End: 2019-04-17

## 2019-04-02 RX ORDER — TAMSULOSIN HYDROCHLORIDE 0.4 MG/1
0.4 CAPSULE ORAL AT BEDTIME
Refills: 0 | Status: DISCONTINUED | OUTPATIENT
Start: 2019-04-02 | End: 2019-04-17

## 2019-04-02 RX ORDER — ONDANSETRON 8 MG/1
4 TABLET, FILM COATED ORAL EVERY 6 HOURS
Refills: 0 | Status: DISCONTINUED | OUTPATIENT
Start: 2019-04-02 | End: 2019-04-17

## 2019-04-02 RX ORDER — OXYCODONE HYDROCHLORIDE 5 MG/1
5 TABLET ORAL EVERY 4 HOURS
Refills: 0 | Status: DISCONTINUED | OUTPATIENT
Start: 2019-04-02 | End: 2019-04-02

## 2019-04-02 RX ORDER — SODIUM CHLORIDE 9 MG/ML
1000 INJECTION, SOLUTION INTRAVENOUS
Refills: 0 | Status: DISCONTINUED | OUTPATIENT
Start: 2019-04-02 | End: 2019-04-17

## 2019-04-02 RX ORDER — FUROSEMIDE 40 MG
10 TABLET ORAL ONCE
Refills: 0 | Status: COMPLETED | OUTPATIENT
Start: 2019-04-02 | End: 2019-04-02

## 2019-04-02 RX ADMIN — Medication 100 MILLIGRAM(S): at 17:01

## 2019-04-02 RX ADMIN — Medication 0.2 MILLILITER(S): at 08:08

## 2019-04-02 RX ADMIN — SODIUM CHLORIDE 125 MILLILITER(S): 9 INJECTION, SOLUTION INTRAVENOUS at 14:24

## 2019-04-02 RX ADMIN — ONDANSETRON 4 MILLIGRAM(S): 8 TABLET, FILM COATED ORAL at 12:33

## 2019-04-02 RX ADMIN — SODIUM CHLORIDE 3 MILLILITER(S): 9 INJECTION INTRAMUSCULAR; INTRAVENOUS; SUBCUTANEOUS at 08:08

## 2019-04-02 RX ADMIN — Medication 10 MILLIGRAM(S): at 17:41

## 2019-04-02 RX ADMIN — Medication 100 MILLIGRAM(S): at 13:12

## 2019-04-02 RX ADMIN — HEPARIN SODIUM 5000 UNIT(S): 5000 INJECTION INTRAVENOUS; SUBCUTANEOUS at 13:55

## 2019-04-02 RX ADMIN — SODIUM CHLORIDE 125 MILLILITER(S): 9 INJECTION, SOLUTION INTRAVENOUS at 20:56

## 2019-04-02 RX ADMIN — HEPARIN SODIUM 5000 UNIT(S): 5000 INJECTION INTRAVENOUS; SUBCUTANEOUS at 21:53

## 2019-04-03 VITALS
RESPIRATION RATE: 18 BRPM | SYSTOLIC BLOOD PRESSURE: 118 MMHG | TEMPERATURE: 98 F | DIASTOLIC BLOOD PRESSURE: 62 MMHG | HEART RATE: 68 BPM | OXYGEN SATURATION: 98 %

## 2019-04-03 LAB
ANION GAP SERPL CALC-SCNC: 11 MMOL/L — SIGNIFICANT CHANGE UP (ref 5–17)
BUN SERPL-MCNC: 14 MG/DL — SIGNIFICANT CHANGE UP (ref 7–23)
CALCIUM SERPL-MCNC: 8.1 MG/DL — LOW (ref 8.4–10.5)
CHLORIDE SERPL-SCNC: 106 MMOL/L — SIGNIFICANT CHANGE UP (ref 96–108)
CO2 SERPL-SCNC: 25 MMOL/L — SIGNIFICANT CHANGE UP (ref 22–31)
CREAT SERPL-MCNC: 0.78 MG/DL — SIGNIFICANT CHANGE UP (ref 0.5–1.3)
GLUCOSE SERPL-MCNC: 131 MG/DL — HIGH (ref 70–99)
HCT VFR BLD CALC: 40.1 % — SIGNIFICANT CHANGE UP (ref 39–50)
HGB BLD-MCNC: 13.8 G/DL — SIGNIFICANT CHANGE UP (ref 13–17)
MCHC RBC-ENTMCNC: 33.7 PG — SIGNIFICANT CHANGE UP (ref 27–34)
MCHC RBC-ENTMCNC: 34.4 GM/DL — SIGNIFICANT CHANGE UP (ref 32–36)
MCV RBC AUTO: 98 FL — SIGNIFICANT CHANGE UP (ref 80–100)
PLATELET # BLD AUTO: 241 K/UL — SIGNIFICANT CHANGE UP (ref 150–400)
POTASSIUM SERPL-MCNC: 3.7 MMOL/L — SIGNIFICANT CHANGE UP (ref 3.5–5.3)
POTASSIUM SERPL-SCNC: 3.7 MMOL/L — SIGNIFICANT CHANGE UP (ref 3.5–5.3)
RBC # BLD: 4.09 M/UL — LOW (ref 4.2–5.8)
RBC # FLD: 12.8 % — SIGNIFICANT CHANGE UP (ref 10.3–14.5)
SODIUM SERPL-SCNC: 142 MMOL/L — SIGNIFICANT CHANGE UP (ref 135–145)
WBC # BLD: 10.3 K/UL — SIGNIFICANT CHANGE UP (ref 3.8–10.5)
WBC # FLD AUTO: 10.3 K/UL — SIGNIFICANT CHANGE UP (ref 3.8–10.5)

## 2019-04-03 PROCEDURE — C1889: CPT

## 2019-04-03 PROCEDURE — C1782: CPT

## 2019-04-03 PROCEDURE — 86901 BLOOD TYPING SEROLOGIC RH(D): CPT

## 2019-04-03 PROCEDURE — 52649 PROSTATE LASER ENUCLEATION: CPT

## 2019-04-03 PROCEDURE — 85027 COMPLETE CBC AUTOMATED: CPT

## 2019-04-03 PROCEDURE — 86900 BLOOD TYPING SEROLOGIC ABO: CPT

## 2019-04-03 PROCEDURE — 88305 TISSUE EXAM BY PATHOLOGIST: CPT

## 2019-04-03 PROCEDURE — 80048 BASIC METABOLIC PNL TOTAL CA: CPT

## 2019-04-03 RX ORDER — SENNA PLUS 8.6 MG/1
2 TABLET ORAL
Qty: 0 | Refills: 0 | DISCHARGE
Start: 2019-04-03

## 2019-04-03 RX ORDER — SODIUM CHLORIDE 9 MG/ML
1000 INJECTION, SOLUTION INTRAVENOUS ONCE
Refills: 0 | Status: COMPLETED | OUTPATIENT
Start: 2019-04-03 | End: 2019-04-03

## 2019-04-03 RX ORDER — OXYCODONE HYDROCHLORIDE 5 MG/1
1 TABLET ORAL
Qty: 0 | Refills: 0 | DISCHARGE
Start: 2019-04-03

## 2019-04-03 RX ORDER — DOCUSATE SODIUM 100 MG
1 CAPSULE ORAL
Qty: 0 | Refills: 0 | DISCHARGE
Start: 2019-04-03

## 2019-04-03 RX ORDER — CEPHALEXIN 500 MG
1 CAPSULE ORAL
Qty: 6 | Refills: 0
Start: 2019-04-03 | End: 2019-04-05

## 2019-04-03 RX ADMIN — Medication 137 MICROGRAM(S): at 06:28

## 2019-04-03 RX ADMIN — SODIUM CHLORIDE 1000 MILLILITER(S): 9 INJECTION, SOLUTION INTRAVENOUS at 07:28

## 2019-04-03 RX ADMIN — Medication 100 MILLIGRAM(S): at 01:06

## 2019-04-03 RX ADMIN — TAMSULOSIN HYDROCHLORIDE 0.4 MILLIGRAM(S): 0.4 CAPSULE ORAL at 06:38

## 2019-04-03 RX ADMIN — Medication 100 MILLIGRAM(S): at 10:15

## 2019-04-03 RX ADMIN — HEPARIN SODIUM 5000 UNIT(S): 5000 INJECTION INTRAVENOUS; SUBCUTANEOUS at 06:28

## 2019-04-03 RX ADMIN — SODIUM CHLORIDE 125 MILLILITER(S): 9 INJECTION, SOLUTION INTRAVENOUS at 07:36

## 2019-04-03 NOTE — ASU DISCHARGE PLAN (ADULT/PEDIATRIC) - CALL YOUR DOCTOR IF YOU HAVE ANY OF THE FOLLOWING:
Bleeding that does not stop/Pain not relieved by Medications/Fever greater than (need to indicate Fahrenheit or Celsius)/Nausea and vomiting that does not stop/Inability to tolerate liquids or foods/Increased irritability or sluggishness

## 2019-04-03 NOTE — PROGRESS NOTE ADULT - ASSESSMENT
70 yo m s/p laser enucleation of the prostate and fulguration of bladder diverticulum     home with montalvo   bolus for hypotension   am labs good   dc home once normotensive
A/P: 68y Male s/p hoLEP, morcellation, fulguration and incision of bladder diverticulum   DVT prophylaxis/OOB  Incentive spirometry  continue CBI overnight, clamp in am if clear   Analgesia and antiemetics as needed  regular Diet  AM labs  likely home tomorrow with montalvo x1week and keflex x3days

## 2019-04-03 NOTE — PROGRESS NOTE ADULT - SUBJECTIVE AND OBJECTIVE BOX
Post op Check    Pt seen and examined, c/o pain at tip of penis. otherwise Pain is controlled. Denies SOB/CP/N/V.     Vital Signs Last 24 Hrs  T(C): 36 (02 Apr 2019 12:00), Max: 36.5 (02 Apr 2019 08:04)  T(F): 96.8 (02 Apr 2019 12:00), Max: 97.7 (02 Apr 2019 08:04)  HR: 65 (02 Apr 2019 14:00) (60 - 81)  BP: 101/55 (02 Apr 2019 14:00) (100/51 - 117/57)  BP(mean): 75 (02 Apr 2019 13:15) (75 - 80)  RR: 17 (02 Apr 2019 14:00) (16 - 18)  SpO2: 96% (02 Apr 2019 14:00) (94% - 97%)    I&O's Summary    CBI    Physical Exam  Gen: NAD, A&Ox3  Pulm: No respiratory distress, no subcostal retractions  CV: RRR, no JVD  Abd: Soft, NT, ND  : +montalvo draining clear on slow CBI                          15.0   13.9  )-----------( 223      ( 02 Apr 2019 12:29 )             42.5       04-02    140  |  106  |  17  ----------------------------<  185<H>  3.9   |  25  |  0.85    Ca    8.1<L>      02 Apr 2019 12:29
Subjective  feeling thirtsy; bp on low side manual is 86/53. no dizziness   Objective    Vital signs  T(F): , Max: 98.6 (04-03-19 @ 04:00)  HR: 57 (04-03-19 @ 07:15)  BP: 84/53 (04-03-19 @ 07:15)  SpO2: 96% (04-03-19 @ 07:15)  Wt(kg): --    Output     04-02 @ 07:01  -  04-03 @ 07:00  --------------------------------------------------------  IN: 2650 mL / OUT: 0 mL / NET: 2650 mL    04-03 @ 07:01  -  04-03 @ 07:31  --------------------------------------------------------  IN: 125 mL / OUT: 0 mL / NET: 125 mL        Gen awake alert nad axox3  Abd soft ntnd   Back no cvat bl    nonpalp urine dilute pink off cbi     Labs      04-03 @ 02:57    WBC 10.3  / Hct 40.1  / SCr 0.78     04-02 @ 12:29    WBC 13.9  / Hct 42.5  / SCr 0.85

## 2019-04-03 NOTE — ASU DISCHARGE PLAN (ADULT/PEDIATRIC) - CARE PROVIDER_API CALL
Domonique Valles)  Urology  [address] [place] [address]  Phone: [phone]  Fax: [phone]  Follow Up Time:

## 2019-04-04 LAB
APPEARANCE: CLEAR
BILIRUBIN URINE: NEGATIVE
BLOOD URINE: NEGATIVE
COLOR: YELLOW
GLUCOSE QUALITATIVE U: NEGATIVE
KETONES URINE: NEGATIVE
LEUKOCYTE ESTERASE URINE: NEGATIVE
NITRITE URINE: NEGATIVE
PH URINE: 6
PROTEIN URINE: NORMAL
SPECIFIC GRAVITY URINE: 1.02
TSH SERPL-ACNC: 7.29 UIU/ML
UROBILINOGEN URINE: NORMAL

## 2019-04-09 LAB — SURGICAL PATHOLOGY STUDY: SIGNIFICANT CHANGE UP

## 2019-04-10 ENCOUNTER — APPOINTMENT (OUTPATIENT)
Dept: UROLOGY | Facility: CLINIC | Age: 69
End: 2019-04-10
Payer: MEDICARE

## 2019-04-10 ENCOUNTER — OUTPATIENT (OUTPATIENT)
Dept: OUTPATIENT SERVICES | Facility: HOSPITAL | Age: 69
LOS: 1 days | End: 2019-04-10
Payer: MEDICARE

## 2019-04-10 DIAGNOSIS — Z98.890 OTHER SPECIFIED POSTPROCEDURAL STATES: Chronic | ICD-10-CM

## 2019-04-10 PROCEDURE — 51700 IRRIGATION OF BLADDER: CPT

## 2019-04-10 PROCEDURE — 99024 POSTOP FOLLOW-UP VISIT: CPT

## 2019-04-10 PROCEDURE — 51700 IRRIGATION OF BLADDER: CPT | Mod: 58

## 2019-04-12 ENCOUNTER — MEDICATION RENEWAL (OUTPATIENT)
Age: 69
End: 2019-04-12

## 2019-04-13 NOTE — HISTORY OF PRESENT ILLNESS
[FreeTextEntry1] : 69 male. \par \par 4/2/19 S/P Cysto, Holmium TURP, Excision of neck diverticulum, fulguration of diverticulum lining\par Pathology: BPH\par \par TOV today. PVR 11cc \par Urine red tinged. No clot.

## 2019-04-13 NOTE — ASSESSMENT
[FreeTextEntry1] : Plan:\par LUISA/UUI leakage expected- wear pads or depends depending on amount of leakage.\par Dysuria expected for the next 1-2 weeks (should be improving)\par Kegel exercises: at least 3 sets per day (10 squeezes per set)\par Drink at least 2 liters of fluid per day (half of which must be water)\par \par f/u with Dr. Steve or Dr. Valles in 3 months

## 2019-04-22 DIAGNOSIS — N40.1 BENIGN PROSTATIC HYPERPLASIA WITH LOWER URINARY TRACT SYMPTOMS: ICD-10-CM

## 2019-04-26 LAB — BACTERIA UR CULT: NORMAL

## 2019-05-30 ENCOUNTER — FORM ENCOUNTER (OUTPATIENT)
Age: 69
End: 2019-05-30

## 2019-05-31 ENCOUNTER — APPOINTMENT (OUTPATIENT)
Dept: ULTRASOUND IMAGING | Facility: IMAGING CENTER | Age: 69
End: 2019-05-31
Payer: MEDICARE

## 2019-05-31 ENCOUNTER — APPOINTMENT (OUTPATIENT)
Dept: INTERNAL MEDICINE | Facility: CLINIC | Age: 69
End: 2019-05-31
Payer: MEDICARE

## 2019-05-31 ENCOUNTER — OUTPATIENT (OUTPATIENT)
Dept: OUTPATIENT SERVICES | Facility: HOSPITAL | Age: 69
LOS: 1 days | End: 2019-05-31
Payer: MEDICARE

## 2019-05-31 VITALS
WEIGHT: 193 LBS | OXYGEN SATURATION: 97 % | DIASTOLIC BLOOD PRESSURE: 68 MMHG | SYSTOLIC BLOOD PRESSURE: 104 MMHG | RESPIRATION RATE: 17 BRPM | BODY MASS INDEX: 28.58 KG/M2 | HEART RATE: 71 BPM | TEMPERATURE: 98.7 F | HEIGHT: 69 IN

## 2019-05-31 DIAGNOSIS — R60.9 EDEMA, UNSPECIFIED: ICD-10-CM

## 2019-05-31 DIAGNOSIS — R79.9 ABNORMAL FINDING OF BLOOD CHEMISTRY, UNSPECIFIED: ICD-10-CM

## 2019-05-31 DIAGNOSIS — L08.9 SEBACEOUS CYST: ICD-10-CM

## 2019-05-31 DIAGNOSIS — R61 GENERALIZED HYPERHIDROSIS: ICD-10-CM

## 2019-05-31 DIAGNOSIS — L03.90 CELLULITIS, UNSPECIFIED: ICD-10-CM

## 2019-05-31 DIAGNOSIS — Z98.890 OTHER SPECIFIED POSTPROCEDURAL STATES: Chronic | ICD-10-CM

## 2019-05-31 DIAGNOSIS — L72.3 SEBACEOUS CYST: ICD-10-CM

## 2019-05-31 PROCEDURE — 93970 EXTREMITY STUDY: CPT | Mod: 26

## 2019-05-31 PROCEDURE — 93970 EXTREMITY STUDY: CPT

## 2019-05-31 PROCEDURE — 99214 OFFICE O/P EST MOD 30 MIN: CPT

## 2019-05-31 RX ORDER — AMOXICILLIN AND CLAVULANATE POTASSIUM 875; 125 MG/1; MG/1
875-125 TABLET, COATED ORAL
Qty: 14 | Refills: 0 | Status: DISCONTINUED | COMMUNITY
Start: 2019-04-15 | End: 2019-05-31

## 2019-06-01 PROBLEM — L72.3 INFECTED SEBACEOUS CYST: Status: ACTIVE | Noted: 2019-01-16

## 2019-06-01 PROBLEM — R61 NIGHT SWEATS: Status: ACTIVE | Noted: 2019-04-15

## 2019-06-01 PROBLEM — R79.9 ABNORMAL BLOOD CHEMISTRY: Status: ACTIVE | Noted: 2019-02-18

## 2019-06-01 PROBLEM — L03.90 CELLULITIS: Status: ACTIVE | Noted: 2019-01-15

## 2019-06-02 ENCOUNTER — MESSAGE (OUTPATIENT)
Age: 69
End: 2019-06-02

## 2019-06-04 ENCOUNTER — MEDICATION RENEWAL (OUTPATIENT)
Age: 69
End: 2019-06-04

## 2019-06-04 LAB
T4 FREE SERPL-MCNC: 1.4 NG/DL
TSH SERPL-ACNC: 5.91 UIU/ML

## 2019-07-08 ENCOUNTER — APPOINTMENT (OUTPATIENT)
Dept: UROLOGY | Facility: CLINIC | Age: 69
End: 2019-07-08
Payer: MEDICARE

## 2019-07-08 PROCEDURE — 99213 OFFICE O/P EST LOW 20 MIN: CPT

## 2019-07-08 NOTE — ASSESSMENT
[FreeTextEntry1] : S/P Cysto, Holmium TURP, Excision of neck diverticulum, fulguration of diverticulum lining on 04/2/19. Pathology showed BPH.\par Nocturia x0-1 with a great stream.\par Overall, Pt is doing well and has no complaints at this time.\par Repeat PSA today.\par RTO in 6 months.

## 2019-07-08 NOTE — HISTORY OF PRESENT ILLNESS
[FreeTextEntry1] : 69 year old male presents for f/u for BPH\par PSA of 9.46 in Dec 2018\par S/P Cysto, Holmium TURP, Excision of neck diverticulum, fulguration of diverticulum lining on 04/2/19. Pathology showed BPH.\par Nocturia x0-1 with a great stream.\par Overall, Pt is doing well and has no complaints at this time.\par Repeat PSA today.\par RTO in 6 months.

## 2019-07-10 LAB — PSA SERPL-MCNC: 7.95 NG/ML

## 2019-07-29 ENCOUNTER — MEDICATION RENEWAL (OUTPATIENT)
Age: 69
End: 2019-07-29

## 2019-08-15 ENCOUNTER — FORM ENCOUNTER (OUTPATIENT)
Age: 69
End: 2019-08-15

## 2019-08-16 ENCOUNTER — APPOINTMENT (OUTPATIENT)
Dept: ULTRASOUND IMAGING | Facility: IMAGING CENTER | Age: 69
End: 2019-08-16
Payer: MEDICARE

## 2019-08-16 ENCOUNTER — OUTPATIENT (OUTPATIENT)
Dept: OUTPATIENT SERVICES | Facility: HOSPITAL | Age: 69
LOS: 1 days | End: 2019-08-16
Payer: MEDICARE

## 2019-08-16 DIAGNOSIS — E03.9 HYPOTHYROIDISM, UNSPECIFIED: ICD-10-CM

## 2019-08-16 DIAGNOSIS — Z98.890 OTHER SPECIFIED POSTPROCEDURAL STATES: Chronic | ICD-10-CM

## 2019-08-16 PROCEDURE — 76536 US EXAM OF HEAD AND NECK: CPT | Mod: 26

## 2019-08-16 PROCEDURE — 76536 US EXAM OF HEAD AND NECK: CPT

## 2019-09-03 ENCOUNTER — MESSAGE (OUTPATIENT)
Age: 69
End: 2019-09-03

## 2019-09-03 DIAGNOSIS — E03.9 HYPOTHYROIDISM, UNSPECIFIED: ICD-10-CM

## 2019-09-19 ENCOUNTER — MEDICATION RENEWAL (OUTPATIENT)
Age: 69
End: 2019-09-19

## 2020-01-30 LAB
PSA FREE FLD-MCNC: 7 %
PSA FREE SERPL-MCNC: 0.56 NG/ML
PSA SERPL-MCNC: 8.43 NG/ML

## 2020-02-12 ENCOUNTER — APPOINTMENT (OUTPATIENT)
Dept: UROLOGY | Facility: CLINIC | Age: 70
End: 2020-02-12
Payer: MEDICARE

## 2020-02-12 PROCEDURE — 99213 OFFICE O/P EST LOW 20 MIN: CPT

## 2020-02-12 NOTE — PHYSICAL EXAM
[General Appearance - Well Developed] : well developed [General Appearance - Well Nourished] : well nourished [Normal Appearance] : normal appearance [Well Groomed] : well groomed [General Appearance - In No Acute Distress] : no acute distress [Abdomen Soft] : soft [Abdomen Tenderness] : non-tender [Costovertebral Angle Tenderness] : no ~M costovertebral angle tenderness [Urinary Bladder Findings] : the bladder was normal on palpation [Urethral Meatus] : meatus normal [Scrotum] : the scrotum was normal [Testes Mass (___cm)] : there were no testicular masses [No Prostate Nodules] : no prostate nodules [Prostate Size ___ gm] : prostate size [unfilled] gm [Edema] : no peripheral edema [Respiration, Rhythm And Depth] : normal respiratory rhythm and effort [] : no respiratory distress [Oriented To Time, Place, And Person] : oriented to person, place, and time [Exaggerated Use Of Accessory Muscles For Inspiration] : no accessory muscle use [Affect] : the affect was normal [Not Anxious] : not anxious [Mood] : the mood was normal [Normal Station and Gait] : the gait and station were normal for the patient's age [No Focal Deficits] : no focal deficits [No Palpable Adenopathy] : no palpable adenopathy [FreeTextEntry1] : smooth and regular

## 2020-02-12 NOTE — HISTORY OF PRESENT ILLNESS
[FreeTextEntry1] : 69 year old male presents for f/u for BPH\par THoLEP was done on 04/02/19\par \par PSA was 8.43 on 01/28/2020. It has been consistent for some time. \par MRI prostate on 01/04/2019 showed prostate volume of 96cc and a suspicious lesion in the left peripheral zone at the base with a PIRADS score of 3. \par We will schedule pt for another MRI of the prostate. \par Pt will RTO 2 days after the MRI.

## 2020-02-12 NOTE — ASSESSMENT
[FreeTextEntry1] : PSA was 8.43 on 01/28/2020. It has been consistent for some time. \par MRI prostate on 01/04/2019 showed prostate volume of 96cc and a suspicious lesion in the left peripheral zone at the base with a PIRADS score of 3. \par We will schedule pt for another MRI of the prostate. \par Pt will RTO 2 days after the MRI.

## 2020-03-08 ENCOUNTER — FORM ENCOUNTER (OUTPATIENT)
Age: 70
End: 2020-03-08

## 2020-03-09 ENCOUNTER — OUTPATIENT (OUTPATIENT)
Dept: OUTPATIENT SERVICES | Facility: HOSPITAL | Age: 70
LOS: 1 days | End: 2020-03-09
Payer: MEDICARE

## 2020-03-09 ENCOUNTER — APPOINTMENT (OUTPATIENT)
Dept: MRI IMAGING | Facility: IMAGING CENTER | Age: 70
End: 2020-03-09
Payer: MEDICARE

## 2020-03-09 DIAGNOSIS — Z98.890 OTHER SPECIFIED POSTPROCEDURAL STATES: Chronic | ICD-10-CM

## 2020-03-09 DIAGNOSIS — R97.20 ELEVATED PROSTATE SPECIFIC ANTIGEN [PSA]: ICD-10-CM

## 2020-03-09 PROCEDURE — 76377 3D RENDER W/INTRP POSTPROCES: CPT

## 2020-03-09 PROCEDURE — 72197 MRI PELVIS W/O & W/DYE: CPT

## 2020-03-09 PROCEDURE — 72197 MRI PELVIS W/O & W/DYE: CPT | Mod: 26

## 2020-03-09 PROCEDURE — 76377 3D RENDER W/INTRP POSTPROCES: CPT | Mod: 26

## 2020-03-09 PROCEDURE — A9585: CPT

## 2020-03-16 ENCOUNTER — APPOINTMENT (OUTPATIENT)
Dept: UROLOGY | Facility: CLINIC | Age: 70
End: 2020-03-16

## 2020-06-17 ENCOUNTER — APPOINTMENT (OUTPATIENT)
Dept: UROLOGY | Facility: CLINIC | Age: 70
End: 2020-06-17
Payer: MEDICARE

## 2020-06-17 ENCOUNTER — OUTPATIENT (OUTPATIENT)
Dept: OUTPATIENT SERVICES | Facility: HOSPITAL | Age: 70
LOS: 1 days | End: 2020-06-17
Payer: MEDICARE

## 2020-06-17 VITALS — TEMPERATURE: 97.7 F

## 2020-06-17 DIAGNOSIS — R82.90 UNSPECIFIED ABNORMAL FINDINGS IN URINE: ICD-10-CM

## 2020-06-17 DIAGNOSIS — R35.0 FREQUENCY OF MICTURITION: ICD-10-CM

## 2020-06-17 DIAGNOSIS — Z98.890 OTHER SPECIFIED POSTPROCEDURAL STATES: Chronic | ICD-10-CM

## 2020-06-17 PROCEDURE — 76870 US EXAM SCROTUM: CPT | Mod: 26

## 2020-06-17 PROCEDURE — 99214 OFFICE O/P EST MOD 30 MIN: CPT | Mod: 25

## 2020-06-17 PROCEDURE — 76870 US EXAM SCROTUM: CPT

## 2020-06-17 NOTE — PHYSICAL EXAM
[General Appearance - Well Developed] : well developed [General Appearance - Well Nourished] : well nourished [Normal Appearance] : normal appearance [Abdomen Soft] : soft [General Appearance - In No Acute Distress] : no acute distress [Well Groomed] : well groomed [Costovertebral Angle Tenderness] : no ~M costovertebral angle tenderness [Abdomen Tenderness] : non-tender [Skin Turgor] : supple [Skin Color & Pigmentation] : normal skin color and pigmentation [Respiration, Rhythm And Depth] : normal respiratory rhythm and effort [Edema] : no peripheral edema [Heart Rate And Rhythm] : Heart rate and rhythm were normal [] : no respiratory distress [Affect] : the affect was normal [Exaggerated Use Of Accessory Muscles For Inspiration] : no accessory muscle use [Oriented To Time, Place, And Person] : oriented to person, place, and time [Mood] : the mood was normal [Normal Station and Gait] : the gait and station were normal for the patient's age [Not Anxious] : not anxious [No Palpable Adenopathy] : no palpable adenopathy [No Focal Deficits] : no focal deficits [Cervical Lymph Nodes Enlarged Anterior Bilaterally] : anterior cervical [Cervical Lymph Nodes Enlarged Posterior Bilaterally] : posterior cervical [Supraclavicular Lymph Nodes Enlarged Bilaterally] : supraclavicular [FreeTextEntry1] : Right side scrotal skin is slightly swollen and red. Pt can touch scrotum without undo pain. Left testicle, epididymis and spermatic cord is non tender, no masses. Hydrocele on right testes which is tender. Right epididymis is more tender. Spermatic cord is tender also. \par

## 2020-06-17 NOTE — ADDENDUM
[FreeTextEntry1] : This note was authored by Pham Templeton working as scribe for Dr. Tiburcio Decker. I, Dr. Tiburcio Decker, have reviewed the content of this note and confirm it is true and accurate. I personally performed the history and physical examination and made all the decisions.\par 06/17/2020 \par

## 2020-06-17 NOTE — HISTORY OF PRESENT ILLNESS
[FreeTextEntry1] : 68 year old male presents for evaluation of test results.\par This pt has been on Flomax for many years . \par Nocturia x 1-2 \par Stream is weak \par Occasional intermittency \par He had Bx in the past, none of which were targeted.\par Uroflow 12/13/18: Voided volume of 55ml and PVR of 140ml. Peak flow of 7.1\par Repeat PSA marked increase in 2 years.\par PSA is now 9.46, last result from 12/13/18. \par MRI on 01/04/19 reported prostate volume of 95.7 cc and PIRADS-3.\par We will schedule pt for targeted prostate Bx. \par We will do a residual on him after his prostate Bx due to previous residual of 140 mL. \par \par 2/5/19: The patient returned today for an MRI/ultrasound fusion guided biopsy of the prostate. Was administered an Amikacin injection. Took Valium and Diazepam as directed. Last biopsy was years ago. Notes his urination is adequate. Has been taking Tamsulosin once daily. Inquired about an operation on the prostate. I advised the patient about his options of a laser enucleation or trying other medication. I discouraged the patient from considering a green light laser as the patient's prostate is larger than most that would undergo this procedure. We discussed the risks and benefits of both, as the patient will decide which route he wants to take in the future. Retired.\par \par 06/17/2020: Patient presents today for a follow up. Pt has moderate pain, but did not think it was serious enough to take pain killers. Has had three Bxs, three MRIs and a TURP in the past. TURP showed prostate tissue was benign. PSA on 09/12/18 was 9.46. On 01/28/2020, the PSA was 8.43. Last prostate Bx on 02/05/19. US today shows and infection of the testicles. US renal today shows epididymitis of the right epididymis, heterogenous in appearance and simple cyst in the head measuring 0.63 cm x 0.58 cm, nonvascular. Hydrocele on the right testes measuring 3.44 x 1.44 x 2.1 cm. On the left epididymis there is a septated cyst measuring 0.65 x 0.60 cm with no vascularity. Bilateral varicocele with vsm measuring 0.31 cm on the right and 0.29 cm on the left.

## 2020-06-17 NOTE — ASSESSMENT
[FreeTextEntry1] : 68 year old male presents for evaluation of test results.\par This pt has been on Flomax for many years . \par Nocturia x 1-2 \par Stream is weak \par Occasional intermittency \par He had Bx in the past, none of which were targeted.\par Uroflow 12/13/18: Voided volume of 55ml and PVR of 140ml. Peak flow of 7.1\par Repeat PSA marked increase in 2 years.\par PSA is now 9.46, last result from 12/13/18. \par MRI on 01/04/19 reported prostate volume of 95.7 cc and PIRADS-3.\par We will schedule pt for targeted prostate Bx. \par We will do a residual on him after his prostate Bx due to previous residual of 140 mL. \par \par 2/5/19: The patient returned today for an MRI/ultrasound fusion guided biopsy of the prostate. Was administered an Amikacin injection. Took Valium and Diazepam as directed. Last biopsy was years ago. Notes his urination is adequate. Has been taking Tamsulosin once daily. Inquired about an operation on the prostate. I advised the patient about his options of a laser enucleation or trying other medication. I discouraged the patient from considering a green light laser as the patient's prostate is larger than most that would undergo this procedure. We discussed the risks and benefits of both, as the patient will decide which route he wants to take in the future. Retired.\par Biopsy: I parted the butt cheeks. Placed the probe in the rectum. Lidocaine jelly used. Measured the prostate volume. Swept from bladder down to the perineum. During the biopsy, there were technical difficulties. After some time, the technical difficulties were fixed and we continued with the procedure. We took four biopsies from the targeted lesion as well as other sampling biopsies. We will send the biopsies to pathology.\par I advised the patient to drink lots of water and that he should expect blood from his urine and stool, but it will resolve. The patient will return in one week for a follow up with Dr. Steve to discuss prostate biopsy results. \par \par 06/17/2020: Patient presents today for a follow up. Pt has moderate pain, but did not think it was serious enough to take pain killers. Has had three Bxs, three MRIs and a TURP in the past. TURP showed prostate tissue was benign. PSA on 09/12/18 was 9.46. On 01/28/2020, the PSA was 8.43. Last prostate Bx on 02/05/19. US today shows and infection of the testicles. US renal today shows epididymitis of the right epididymis, heterogenous in appearance and simple cyst in the head measuring 0.63 cm x 0.58 cm, nonvascular. Hydrocele on the right testes measuring 3.44 x 1.44 x 2.1 cm. On the left epididymis there is a septated cyst measuring 0.65 x 0.60 cm with no vascularity. Bilateral varicocele with vsm measuring 0.31 cm on the right and 0.29 cm on the left. \par \par Right side scrotal skin is slightly swollen and red. Pt can touch scrotum without undo pain. Left testicle, epididymis and spermatic cord is non tender, no masses. Hydrocele on right testes which is tender. Right epididymis is more tender. Spermatic cord is also tender\par \par Prescribed Sulfamethoxazole- Trimethoprim BID with food for 2 weeks. Advised pt to avoid exercise and sex while on the pill.  \par \par Pt will provide a urine sample today for culture, cytology and analysis. \par \par Scheduled repeat US testis. RTO in 2 weeks for US renal

## 2020-06-23 DIAGNOSIS — R82.90 UNSPECIFIED ABNORMAL FINDINGS IN URINE: ICD-10-CM

## 2020-06-23 DIAGNOSIS — N45.2 ORCHITIS: ICD-10-CM

## 2020-06-23 DIAGNOSIS — R93.5 ABNORMAL FINDINGS ON DIAGNOSTIC IMAGING OF OTHER ABDOMINAL REGIONS, INCLUDING RETROPERITONEUM: ICD-10-CM

## 2020-07-01 ENCOUNTER — OUTPATIENT (OUTPATIENT)
Dept: OUTPATIENT SERVICES | Facility: HOSPITAL | Age: 70
LOS: 1 days | End: 2020-07-01
Payer: MEDICARE

## 2020-07-01 ENCOUNTER — APPOINTMENT (OUTPATIENT)
Dept: UROLOGY | Facility: CLINIC | Age: 70
End: 2020-07-01
Payer: MEDICARE

## 2020-07-01 VITALS — TEMPERATURE: 98 F

## 2020-07-01 DIAGNOSIS — Z98.890 OTHER SPECIFIED POSTPROCEDURAL STATES: Chronic | ICD-10-CM

## 2020-07-01 DIAGNOSIS — R35.0 FREQUENCY OF MICTURITION: ICD-10-CM

## 2020-07-01 PROCEDURE — 76775 US EXAM ABDO BACK WALL LIM: CPT | Mod: 26

## 2020-07-01 PROCEDURE — 99214 OFFICE O/P EST MOD 30 MIN: CPT | Mod: 25

## 2020-07-01 PROCEDURE — 76775 US EXAM ABDO BACK WALL LIM: CPT

## 2020-07-01 NOTE — ADDENDUM
[FreeTextEntry1] : This note was authored by Pham Templeton working as scribe for Dr. Tiburcio Decker. I, Dr. Tiburcio Decker, have reviewed the content of this note and confirm it is true and accurate. I personally performed the history and physical examination and made all the decisions.\par 07/01/2020

## 2020-07-01 NOTE — PHYSICAL EXAM
[Normal Appearance] : normal appearance [General Appearance - Well Nourished] : well nourished [General Appearance - Well Developed] : well developed [Well Groomed] : well groomed [General Appearance - In No Acute Distress] : no acute distress [Abdomen Soft] : soft [Costovertebral Angle Tenderness] : no ~M costovertebral angle tenderness [Abdomen Tenderness] : non-tender [Skin Turgor] : supple [Skin Color & Pigmentation] : normal skin color and pigmentation [Edema] : no peripheral edema [Heart Rate And Rhythm] : Heart rate and rhythm were normal [Respiration, Rhythm And Depth] : normal respiratory rhythm and effort [] : no respiratory distress [Exaggerated Use Of Accessory Muscles For Inspiration] : no accessory muscle use [Oriented To Time, Place, And Person] : oriented to person, place, and time [Mood] : the mood was normal [Affect] : the affect was normal [Not Anxious] : not anxious [No Focal Deficits] : no focal deficits [Normal Station and Gait] : the gait and station were normal for the patient's age [Cervical Lymph Nodes Enlarged Anterior Bilaterally] : anterior cervical [No Palpable Adenopathy] : no palpable adenopathy [Cervical Lymph Nodes Enlarged Posterior Bilaterally] : posterior cervical [Supraclavicular Lymph Nodes Enlarged Bilaterally] : supraclavicular [FreeTextEntry1] : Left hemiscrotum is normal in appearance. Right hemiscrotum is faintly more red. There is some fluid around the right, minimally tender, normal consistency and slightly larger than the left. Healing well. \par

## 2020-07-01 NOTE — HISTORY OF PRESENT ILLNESS
[FreeTextEntry1] : 68 year old male presents for evaluation of test results.\par This pt has been on Flomax for many years . \par Nocturia x 1-2 \par Stream is weak \par Occasional intermittency \par He had Bx in the past, none of which were targeted.\par Uroflow 12/13/18: Voided volume of 55ml and PVR of 140ml. Peak flow of 7.1\par Repeat PSA marked increase in 2 years.\par PSA is now 9.46, last result from 12/13/18. \par MRI on 01/04/19 reported prostate volume of 95.7 cc and PIRADS-3.\par We will schedule pt for targeted prostate Bx. \par We will do a residual on him after his prostate Bx due to previous residual of 140 mL. \par \par 2/5/19: The patient returned today for an MRI/ultrasound fusion guided biopsy of the prostate. Was administered an Amikacin injection. Took Valium and Diazepam as directed. Last biopsy was years ago. Notes his urination is adequate. Has been taking Tamsulosin once daily. Inquired about an operation on the prostate. I advised the patient about his options of a laser enucleation or trying other medication. I discouraged the patient from considering a green light laser as the patient's prostate is larger than most that would undergo this procedure. We discussed the risks and benefits of both, as the patient will decide which route he wants to take in the future. Retired.\par 06/17/2020: Patient presents today for a follow up. Pt has moderate pain, but did not think it was serious enough to take pain killers. Has had three Bxs, three MRIs and a TURP in the past. TURP showed prostate tissue was benign. PSA on 09/12/18 was 9.46. On 01/28/2020, the PSA was 8.43. Last prostate Bx on 02/05/19. US today shows and infection of the testicles. US renal today shows epididymitis of the right epididymis, heterogenous in appearance and simple cyst in the head measuring 0.63 cm x 0.58 cm, nonvascular. Hydrocele on the right testes measuring 3.44 x 1.44 x 2.1 cm. On the left epididymis there is a septated cyst measuring 0.65 x 0.60 cm with no vascularity. Bilateral varicocele with vsm measuring 0.31 cm on the right and 0.29 cm on the left. \par \par 07/01/2020: Patient presents today for a follow up. PSA was 8.43 on 01/28/2020. US renal today shows improvement on the right side, but still some vascularity. No stones, tumors or cysts. Pain has gone away with Sulfamethoxazole- Trimethoprim. Reports testicle are tender and of uneven size. Pt had a prostate Bx scheduled, but it has been delayed. MRI prostate on 03/09/2020 showed prostate volume of 22cc and a suspicious lesion in the left anterior peripheral zone with a PIRADS score of 4. Pt reports no artificial parts, doesn't take aspirin, or blood thinners/inhibitor. No medical allergies

## 2020-07-03 LAB
ALP BLD-CCNC: 64 U/L
ANION GAP SERPL CALC-SCNC: 12 MMOL/L
BUN SERPL-MCNC: 17 MG/DL
CALCIUM SERPL-MCNC: 9.4 MG/DL
CHLORIDE SERPL-SCNC: 105 MMOL/L
CO2 SERPL-SCNC: 24 MMOL/L
CREAT SERPL-MCNC: 1.28 MG/DL
GLUCOSE SERPL-MCNC: 92 MG/DL
POTASSIUM SERPL-SCNC: 5.3 MMOL/L
PSA FREE FLD-MCNC: 6 %
PSA FREE SERPL-MCNC: 0.6 NG/ML
PSA SERPL-MCNC: 10.7 NG/ML
SODIUM SERPL-SCNC: 142 MMOL/L
TESTOST SERPL-MCNC: 399 NG/DL

## 2020-07-13 DIAGNOSIS — N50.819 TESTICULAR PAIN, UNSPECIFIED: ICD-10-CM

## 2020-07-13 DIAGNOSIS — N45.2 ORCHITIS: ICD-10-CM

## 2020-07-13 DIAGNOSIS — N40.1 BENIGN PROSTATIC HYPERPLASIA WITH LOWER URINARY TRACT SYMPTOMS: ICD-10-CM

## 2020-07-13 DIAGNOSIS — R93.5 ABNORMAL FINDINGS ON DIAGNOSTIC IMAGING OF OTHER ABDOMINAL REGIONS, INCLUDING RETROPERITONEUM: ICD-10-CM

## 2020-07-13 DIAGNOSIS — R97.20 ELEVATED PROSTATE SPECIFIC ANTIGEN [PSA]: ICD-10-CM

## 2020-07-20 ENCOUNTER — APPOINTMENT (OUTPATIENT)
Dept: UROLOGY | Facility: CLINIC | Age: 70
End: 2020-07-20
Payer: MEDICARE

## 2020-07-20 ENCOUNTER — OUTPATIENT (OUTPATIENT)
Dept: OUTPATIENT SERVICES | Facility: HOSPITAL | Age: 70
LOS: 1 days | End: 2020-07-20
Payer: MEDICARE

## 2020-07-20 VITALS — DIASTOLIC BLOOD PRESSURE: 71 MMHG | RESPIRATION RATE: 17 BRPM | HEART RATE: 67 BPM | SYSTOLIC BLOOD PRESSURE: 117 MMHG

## 2020-07-20 VITALS — TEMPERATURE: 98.5 F

## 2020-07-20 DIAGNOSIS — Z98.890 OTHER SPECIFIED POSTPROCEDURAL STATES: Chronic | ICD-10-CM

## 2020-07-20 DIAGNOSIS — R35.0 FREQUENCY OF MICTURITION: ICD-10-CM

## 2020-07-20 PROCEDURE — 76872 US TRANSRECTAL: CPT

## 2020-07-20 PROCEDURE — 55700: CPT

## 2020-07-20 PROCEDURE — 76942 ECHO GUIDE FOR BIOPSY: CPT | Mod: 59

## 2020-07-20 PROCEDURE — 76872 US TRANSRECTAL: CPT | Mod: 26

## 2020-07-20 PROCEDURE — 88305 TISSUE EXAM BY PATHOLOGIST: CPT | Mod: 26

## 2020-07-20 PROCEDURE — 76942 ECHO GUIDE FOR BIOPSY: CPT | Mod: 26,59

## 2020-07-25 LAB — CORE LAB BIOPSY: NORMAL

## 2020-07-28 ENCOUNTER — APPOINTMENT (OUTPATIENT)
Dept: UROLOGY | Facility: CLINIC | Age: 70
End: 2020-07-28
Payer: MEDICARE

## 2020-07-28 VITALS — TEMPERATURE: 98.9 F

## 2020-07-28 VITALS — DIASTOLIC BLOOD PRESSURE: 74 MMHG | SYSTOLIC BLOOD PRESSURE: 110 MMHG | HEART RATE: 57 BPM

## 2020-07-28 DIAGNOSIS — N41.9 INFLAMMATORY DISEASE OF PROSTATE, UNSPECIFIED: ICD-10-CM

## 2020-07-28 DIAGNOSIS — R97.20 ELEVATED PROSTATE SPECIFIC ANTIGEN [PSA]: ICD-10-CM

## 2020-07-28 DIAGNOSIS — R93.5 ABNORMAL FINDINGS ON DIAGNOSTIC IMAGING OF OTHER ABDOMINAL REGIONS, INCLUDING RETROPERITONEUM: ICD-10-CM

## 2020-07-28 PROCEDURE — 99214 OFFICE O/P EST MOD 30 MIN: CPT

## 2020-07-31 ENCOUNTER — TRANSCRIPTION ENCOUNTER (OUTPATIENT)
Age: 70
End: 2020-07-31

## 2020-08-01 PROBLEM — R93.5 ABNORMAL MRI, PELVIS: Status: ACTIVE | Noted: 2019-02-05

## 2020-08-01 NOTE — ASSESSMENT
[FreeTextEntry1] : 2/5/19: The patient returned today for an MRI/ultrasound fusion guided biopsy of the prostate. Was administered an Amikacin injection. Took Valium and Diazepam as directed. Last biopsy was years ago. Notes his urination is adequate. Has been taking Tamsulosin once daily. Inquired about an operation on the prostate. I advised the patient about his options of a laser enucleation or trying other medication. I discouraged the patient from considering a green light laser as the patient's prostate is larger than most that would undergo this procedure. We discussed the risks and benefits of both, as the patient will decide which route he wants to take in the future. Retired.\par Biopsy: I parted the butt cheeks. Placed the probe in the rectum. Lidocaine jelly used. Measured the prostate volume. Swept from bladder down to the perineum. During the biopsy, there were technical difficulties. After some time, the technical difficulties were fixed and we continued with the procedure. We took four biopsies from the targeted lesion as well as other sampling biopsies. We will send the biopsies to pathology.\par I advised the patient to drink lots of water and that he should expect blood from his urine and stool, but it will resolve. The patient will return in one week for a follow up with Dr. Steve to discuss prostate biopsy results. \par 06/17/2020: Patient presents today for a follow up. Pt has moderate pain, but did not think it was serious enough to take pain killers. Has had three Bxs, three MRIs and a TURP in the past. TURP showed prostate tissue was benign. PSA on 09/12/18 was 9.46. On 01/28/2020, the PSA was 8.43. Last prostate Bx on 02/05/19. US today shows and infection of the testicles. US renal today shows epididymitis of the right epididymis, heterogenous in appearance and simple cyst in the head measuring 0.63 cm x 0.58 cm, nonvascular. Hydrocele on the right testes measuring 3.44 x 1.44 x 2.1 cm. On the left epididymis there is a septated cyst measuring 0.65 x 0.60 cm with no vascularity. Bilateral varicocele with vsm measuring 0.31 cm on the right and 0.29 cm on the left. Right side scrotal skin is slightly swollen and red. Pt can touch scrotum without undo pain. Left testicle, epididymis and spermatic cord is non tender, no masses. Hydrocele on right testes which is tender. Right epididymis is more tender. Spermatic cord is also tender. Prescribed Sulfamethoxazole- Trimethoprim BID with food for 2 weeks. Advised pt to avoid exercise and sex while on the pill. Pt will provide a urine sample today for culture, cytology and analysis. Scheduled repeat US testis. RTO in 2 weeks for US renal \par 07/01/2020: Patient presents today for a follow up. PSA was 8.43 on 01/28/2020. US renal today shows improvement on the right side, but still some vascularity. No stones, tumors or cysts. Pain has gone away with Sulfamethoxazole- Trimethoprim. Reports testicle are tender and of uneven size. Pt had a prostate Bx scheduled, but it has been delayed. MRI prostate on 03/09/2020 showed prostate volume of 22cc and a suspicious lesion in the left anterior peripheral zone with a PIRADS score of 4. Pt reports no artificial parts, doesn't take aspirin, or blood thinners/inhibitor. No medical allergies Pt will provide a urine sample today for culture, cytology and analysis. Bloodwork today will include ALK PHOS, BMP PSA and testosterone. Pt will have transperineal fusion prostate Bx with local anesthesia. Risks and benefits explained. Alternative of having biopsy under anesthesia offered and explained. Alternative of observation without biopsy at this time discussed. If pt is found to have low grade cancer, it will be watched. If it is aggressive, he will have surgical removal of the prostate or radiation. He will take Valium 10 mg 1 hour before the procedure. RTO in 1-4 weeks.\par \par 07/28/2020: Patient presents today for a follow up. On 07/01/2020, PSA was 10.70, Creatinine was 1.28, ALK PHOS was 64, and Testosterone was 399. Prostate Bx from 07/20/2020 with one target reveals Ana 4+3=7. No urinary symptoms. \par \par Discussed options for treatment of prostate cancer including radiation and surgery. Referred to Dr. Shannon Davenport for radiation. Also referred pt to Dr. Johnson for surgery. \par \par Pt will provide a urine sample today for culture, cytology and analysis. \par \par Pt will make a telehealth appointment for 2-3 weeks time.

## 2020-08-01 NOTE — ADDENDUM
[FreeTextEntry1] : This note was authored by Pham Templeton working as scribe for Dr. Tiburcio Decker. I, Dr. Tiburcio Decker, have reviewed the content of this note and confirm it is true and accurate. I personally performed the history and physical examination and made all the decisions.\par 07/28/2020

## 2020-08-01 NOTE — PHYSICAL EXAM
[General Appearance - Well Developed] : well developed [General Appearance - Well Nourished] : well nourished [Normal Appearance] : normal appearance [Well Groomed] : well groomed [General Appearance - In No Acute Distress] : no acute distress [Abdomen Soft] : soft [Abdomen Tenderness] : non-tender [Costovertebral Angle Tenderness] : no ~M costovertebral angle tenderness [Skin Color & Pigmentation] : normal skin color and pigmentation [Skin Turgor] : supple [Heart Rate And Rhythm] : Heart rate and rhythm were normal [Edema] : no peripheral edema [] : no respiratory distress [Respiration, Rhythm And Depth] : normal respiratory rhythm and effort [Oriented To Time, Place, And Person] : oriented to person, place, and time [Exaggerated Use Of Accessory Muscles For Inspiration] : no accessory muscle use [Affect] : the affect was normal [Mood] : the mood was normal [Not Anxious] : not anxious [Normal Station and Gait] : the gait and station were normal for the patient's age [No Focal Deficits] : no focal deficits [No Palpable Adenopathy] : no palpable adenopathy [Cervical Lymph Nodes Enlarged Posterior Bilaterally] : posterior cervical [Supraclavicular Lymph Nodes Enlarged Bilaterally] : supraclavicular [Cervical Lymph Nodes Enlarged Anterior Bilaterally] : anterior cervical [FreeTextEntry1] : Left hemiscrotum is normal in appearance. Right hemiscrotum is faintly more red. There is some fluid around the right, minimally tender, normal consistency and slightly larger than the left. Healing well. \par

## 2020-09-02 ENCOUNTER — APPOINTMENT (OUTPATIENT)
Dept: UROLOGY | Facility: CLINIC | Age: 70
End: 2020-09-02

## 2020-10-14 ENCOUNTER — APPOINTMENT (OUTPATIENT)
Dept: UROLOGY | Facility: CLINIC | Age: 70
End: 2020-10-14

## 2020-10-15 ENCOUNTER — APPOINTMENT (OUTPATIENT)
Dept: UROLOGY | Facility: CLINIC | Age: 70
End: 2020-10-15
Payer: MEDICARE

## 2020-10-15 ENCOUNTER — APPOINTMENT (OUTPATIENT)
Dept: UROLOGY | Facility: CLINIC | Age: 70
End: 2020-10-15

## 2020-10-15 PROCEDURE — 99442: CPT | Mod: 95

## 2020-10-22 PROBLEM — Z00.00 MEDICARE ANNUAL WELLNESS VISIT, SUBSEQUENT: Status: RESOLVED | Noted: 2017-06-13 | Resolved: 2020-10-22

## 2020-10-22 PROBLEM — Z00.00 MEDICARE ANNUAL WELLNESS VISIT, SUBSEQUENT: Status: ACTIVE | Noted: 2019-02-14

## 2021-01-20 LAB
PSA FREE FLD-MCNC: 9 %
PSA FREE SERPL-MCNC: 0.17 NG/ML
PSA SERPL-MCNC: 1.88 NG/ML

## 2021-01-26 ENCOUNTER — APPOINTMENT (OUTPATIENT)
Dept: UROLOGY | Facility: CLINIC | Age: 71
End: 2021-01-26
Payer: MEDICARE

## 2021-01-26 PROCEDURE — 99215 OFFICE O/P EST HI 40 MIN: CPT

## 2021-01-26 NOTE — PHYSICAL EXAM
[General Appearance - Well Developed] : well developed [General Appearance - Well Nourished] : well nourished [Normal Appearance] : normal appearance [Well Groomed] : well groomed [General Appearance - In No Acute Distress] : no acute distress [Abdomen Soft] : soft [Abdomen Tenderness] : non-tender [Edema] : no peripheral edema [] : no respiratory distress [Respiration, Rhythm And Depth] : normal respiratory rhythm and effort [Exaggerated Use Of Accessory Muscles For Inspiration] : no accessory muscle use [Oriented To Time, Place, And Person] : oriented to person, place, and time [Affect] : the affect was normal [Mood] : the mood was normal [Not Anxious] : not anxious [Normal Station and Gait] : the gait and station were normal for the patient's age [No Focal Deficits] : no focal deficits [FreeTextEntry1] : Wears reading glasses.

## 2021-01-26 NOTE — HISTORY OF PRESENT ILLNESS
[FreeTextEntry1] : 68 year old male presents for evaluation of test results.\par This pt has been on Flomax for many years . \par Nocturia x 1-2 \par Stream is weak \par Occasional intermittency \par He had Bx in the past, none of which were targeted.\par Uroflow 12/13/18: Voided volume of 55ml and PVR of 140ml. Peak flow of 7.1\par Repeat PSA marked increase in 2 years.\par PSA is now 9.46, last result from 12/13/18. \par MRI on 01/04/19 reported prostate volume of 95.7 cc and PIRADS-3.\par We will schedule pt for targeted prostate Bx. \par We will do a residual on him after his prostate Bx due to previous residual of 140 mL. \par \par 2/5/19: The patient returned today for an MRI/ultrasound fusion guided biopsy of the prostate. Was administered an Amikacin injection. Took Valium and Diazepam as directed. Last biopsy was years ago. Notes his urination is adequate. Has been taking Tamsulosin once daily. Inquired about an operation on the prostate. I advised the patient about his options of a laser enucleation or trying other medication. I discouraged the patient from considering a green light laser as the patient's prostate is larger than most that would undergo this procedure. We discussed the risks and benefits of both, as the patient will decide which route he wants to take in the future. Retired.\par 06/17/2020: Patient presents today for a follow up. Pt has moderate pain, but did not think it was serious enough to take pain killers. Has had three Bxs, three MRIs and a TURP in the past. TURP showed prostate tissue was benign. PSA on 09/12/18 was 9.46. On 01/28/2020, the PSA was 8.43. Last prostate Bx on 02/05/19. US today shows and infection of the testicles. US renal today shows epididymitis of the right epididymis, heterogenous in appearance and simple cyst in the head measuring 0.63 cm x 0.58 cm, nonvascular. Hydrocele on the right testes measuring 3.44 x 1.44 x 2.1 cm. On the left epididymis there is a septated cyst measuring 0.65 x 0.60 cm with no vascularity. Bilateral varicocele with vsm measuring 0.31 cm on the right and 0.29 cm on the left. \par \par 07/01/2020: Patient presents today for a follow up. PSA was 8.43 on 01/28/2020. US renal today shows improvement on the right side, but still some vascularity. No stones, tumors or cysts. Pain has gone away with Sulfamethoxazole- Trimethoprim. Reports testicle are tender and of uneven size. Pt had a prostate Bx scheduled, but it has been delayed. MRI prostate on 03/09/2020 showed prostate volume of 22cc and a suspicious lesion in the left anterior peripheral zone with a PIRADS score of 4. Pt reports no artificial parts, doesn't take aspirin, or blood thinners/inhibitor. No medical allergies \par \par 01/26/2021: The pt presents today for a follow up. He has undergone radiation therapy at [facility]. 5 day maximum dosage, TrueBeam radiation. On the 5th day he had some burning and his urination was like it had been before the TURP but those subsided. The pt feels great overall. He has some nocturia 1x but nothing bothersome. Denies any urinary urgency, hematuria, pushing, straining, diarrhea, constipation, chest pains. He has not undergone any hormone treatment after getting multiple opinions. His last PSA from 1/20/2021 was 1.88. He is done seeing the radiation oncologist for now, . He reports he recently had a bone scan  ordered by  that he will have the results sent over. He reports that the bone scan was good. He does not got erections anymore but he does not have any interest right now.

## 2021-01-26 NOTE — ASSESSMENT
[FreeTextEntry1] : 2/5/19: The patient returned today for an MRI/ultrasound fusion guided biopsy of the prostate. Was administered an Amikacin injection. Took Valium and Diazepam as directed. Last biopsy was years ago. Notes his urination is adequate. Has been taking Tamsulosin once daily. Inquired about an operation on the prostate. I advised the patient about his options of a laser enucleation or trying other medication. I discouraged the patient from considering a green light laser as the patient's prostate is larger than most that would undergo this procedure. We discussed the risks and benefits of both, as the patient will decide which route he wants to take in the future. Retired.\par Biopsy: I parted the butt cheeks. Placed the probe in the rectum. Lidocaine jelly used. Measured the prostate volume. Swept from bladder down to the perineum. During the biopsy, there were technical difficulties. After some time, the technical difficulties were fixed and we continued with the procedure. We took four biopsies from the targeted lesion as well as other sampling biopsies. We will send the biopsies to pathology.\par I advised the patient to drink lots of water and that he should expect blood from his urine and stool, but it will resolve. The patient will return in one week for a follow up with Dr. Steve to discuss prostate biopsy results. \par 06/17/2020: Patient presents today for a follow up. Pt has moderate pain, but did not think it was serious enough to take pain killers. Has had three Bxs, three MRIs and a TURP in the past. TURP showed prostate tissue was benign. PSA on 09/12/18 was 9.46. On 01/28/2020, the PSA was 8.43. Last prostate Bx on 02/05/19. US today shows and infection of the testicles. US renal today shows epididymitis of the right epididymis, heterogenous in appearance and simple cyst in the head measuring 0.63 cm x 0.58 cm, nonvascular. Hydrocele on the right testes measuring 3.44 x 1.44 x 2.1 cm. On the left epididymis there is a septated cyst measuring 0.65 x 0.60 cm with no vascularity. Bilateral varicocele with vsm measuring 0.31 cm on the right and 0.29 cm on the left. Right side scrotal skin is slightly swollen and red. Pt can touch scrotum without undo pain. Left testicle, epididymis and spermatic cord is non tender, no masses. Hydrocele on right testes which is tender. Right epididymis is more tender. Spermatic cord is also tender. Prescribed Sulfamethoxazole- Trimethoprim BID with food for 2 weeks. Advised pt to avoid exercise and sex while on the pill. Pt will provide a urine sample today for culture, cytology and analysis. Scheduled repeat US testis. RTO in 2 weeks for US renal \par 07/01/2020: Patient presents today for a follow up. PSA was 8.43 on 01/28/2020. US renal today shows improvement on the right side, but still some vascularity. No stones, tumors or cysts. Pain has gone away with Sulfamethoxazole- Trimethoprim. Reports testicle are tender and of uneven size. Pt had a prostate Bx scheduled, but it has been delayed. MRI prostate on 03/09/2020 showed prostate volume of 22cc and a suspicious lesion in the left anterior peripheral zone with a PIRADS score of 4. Pt reports no artificial parts, doesn't take aspirin, or blood thinners/inhibitor. No medical allergies Pt will provide a urine sample today for culture, cytology and analysis. Bloodwork today will include ALK PHOS, BMP PSA and testosterone. Pt will have transperineal fusion prostate Bx with local anesthesia. Risks and benefits explained. Alternative of having biopsy under anesthesia offered and explained. Alternative of observation without biopsy at this time discussed. If pt is found to have low grade cancer, it will be watched. If it is aggressive, he will have surgical removal of the prostate or radiation. He will take Valium 10 mg 1 hour before the procedure. RTO in 1-4 weeks.\par \par 07/28/2020: Patient presents today for a follow up. On 07/01/2020, PSA was 10.70, Creatinine was 1.28, ALK PHOS was 64, and Testosterone was 399. Prostate Bx from 07/20/2020 with one target reveals Ana 4+3=7. No urinary symptoms. \par \par Discussed options for treatment of prostate cancer including radiation and surgery. Referred to Dr. Shannon Davenport for radiation. Also referred pt to Dr. Johnson for surgery. \par Pt will provide a urine sample today for culture, cytology and analysis. \par Pt will make a telehealth appointment for 2-3 weeks time. \par \par 01/26/2021: The pt presents today for a follow up. He has undergone radiation therapy at [facility]. 5 day maximum dosage, TrueBeam radiation. On the 5th day he had some burning and his urination was like it had been before the TURP but those subsided. The pt feels great overall. He has some nocturia 1x but nothing bothersome. Denies any urinary urgency, hematuria, pushing, straining, diarrhea, constipation, chest pains. He has not undergone any hormone treatment after getting multiple opinions. His last PSA from 1/20/2021 was 1.88. He is done seeing the radiation oncologist for now, . He reports he recently had a bone scan ordered by  that he will have the results sent over. He reports that the bone scan was good. He does not got erections anymore but he does not have any interest right now. \par \par All questions and concerns were addressed. I explained the options to the patient in detail if he were to have any reoccurrence. \par \par Patient will RTO every 3 months for one year and then every 6 months after that to monitor his PSA, ADWOA's  urination, and overall how he is feeling. The patient will get blood work done before his next visit. \par \par Preparation, in person office visit, and coordination of care: 40 minutes.

## 2021-05-10 ENCOUNTER — LABORATORY RESULT (OUTPATIENT)
Age: 71
End: 2021-05-10

## 2021-05-10 ENCOUNTER — APPOINTMENT (OUTPATIENT)
Dept: UROLOGY | Facility: CLINIC | Age: 71
End: 2021-05-10

## 2021-05-24 ENCOUNTER — APPOINTMENT (OUTPATIENT)
Dept: UROLOGY | Facility: CLINIC | Age: 71
End: 2021-05-24
Payer: MEDICARE

## 2021-05-24 PROCEDURE — 88112 CYTOPATH CELL ENHANCE TECH: CPT | Mod: 26

## 2021-05-24 PROCEDURE — 99215 OFFICE O/P EST HI 40 MIN: CPT

## 2021-05-24 NOTE — ADDENDUM
[FreeTextEntry1] : I, Bianca Washburnin, acted solely as a scribe for Dr. Tiburcio Decker on this date 05/24/2021.\par \par All medical record entries made by the Scribe were at my, Dr. Tiburcio Decker, direction and personally dictated by me on 05/24/2021. I have reviewed the chart and agree that the record accurately reflects my personal performance of the history, physical exam, assessment and plan.  I have also personally directed, reviewed and agreed with the chart.

## 2021-05-24 NOTE — ASSESSMENT
[FreeTextEntry1] : 2/5/19: The patient returned today for an MRI/ultrasound fusion guided biopsy of the prostate. Was administered an Amikacin injection. Took Valium and Diazepam as directed. Last biopsy was years ago. Notes his urination is adequate. Has been taking Tamsulosin once daily. Inquired about an operation on the prostate. I advised the patient about his options of a laser enucleation or trying other medication. I discouraged the patient from considering a green light laser as the patient's prostate is larger than most that would undergo this procedure. We discussed the risks and benefits of both, as the patient will decide which route he wants to take in the future. Retired.\par Biopsy: I parted the butt cheeks. Placed the probe in the rectum. Lidocaine jelly used. Measured the prostate volume. Swept from bladder down to the perineum. During the biopsy, there were technical difficulties. After some time, the technical difficulties were fixed and we continued with the procedure. We took four biopsies from the targeted lesion as well as other sampling biopsies. We will send the biopsies to pathology.\par I advised the patient to drink lots of water and that he should expect blood from his urine and stool, but it will resolve. The patient will return in one week for a follow up with Dr. Steve to discuss prostate biopsy results. \par 06/17/2020: Patient presents today for a follow up. Pt has moderate pain, but did not think it was serious enough to take pain killers. Has had three Bxs, three MRIs and a TURP in the past. TURP showed prostate tissue was benign. PSA on 09/12/18 was 9.46. On 01/28/2020, the PSA was 8.43. Last prostate Bx on 02/05/19. US today shows and infection of the testicles. US renal today shows epididymitis of the right epididymis, heterogenous in appearance and simple cyst in the head measuring 0.63 cm x 0.58 cm, nonvascular. Hydrocele on the right testes measuring 3.44 x 1.44 x 2.1 cm. On the left epididymis there is a septated cyst measuring 0.65 x 0.60 cm with no vascularity. Bilateral varicocele with vsm measuring 0.31 cm on the right and 0.29 cm on the left. Right side scrotal skin is slightly swollen and red. Pt can touch scrotum without undo pain. Left testicle, epididymis and spermatic cord is non tender, no masses. Hydrocele on right testes which is tender. Right epididymis is more tender. Spermatic cord is also tender. Prescribed Sulfamethoxazole- Trimethoprim BID with food for 2 weeks. Advised pt to avoid exercise and sex while on the pill. Pt will provide a urine sample today for culture, cytology and analysis. Scheduled repeat US testis. RTO in 2 weeks for US renal \par 07/01/2020: Patient presents today for a follow up. PSA was 8.43 on 01/28/2020. US renal today shows improvement on the right side, but still some vascularity. No stones, tumors or cysts. Pain has gone away with Sulfamethoxazole- Trimethoprim. Reports testicle are tender and of uneven size. Pt had a prostate Bx scheduled, but it has been delayed. MRI prostate on 03/09/2020 showed prostate volume of 22cc and a suspicious lesion in the left anterior peripheral zone with a PIRADS score of 4. Pt reports no artificial parts, doesn't take aspirin, or blood thinners/inhibitor. No medical allergies Pt will provide a urine sample today for culture, cytology and analysis. Bloodwork today will include ALK PHOS, BMP PSA and testosterone. Pt will have transperineal fusion prostate Bx with local anesthesia. Risks and benefits explained. Alternative of having biopsy under anesthesia offered and explained. Alternative of observation without biopsy at this time discussed. If pt is found to have low grade cancer, it will be watched. If it is aggressive, he will have surgical removal of the prostate or radiation. He will take Valium 10 mg 1 hour before the procedure. RTO in 1-4 weeks.\par \par 07/28/2020: Patient presents today for a follow up. On 07/01/2020, PSA was 10.70, Creatinine was 1.28, ALK PHOS was 64, and Testosterone was 399. Prostate Bx from 07/20/2020 with one target reveals Ana 4+3=7. No urinary symptoms. \par \par 01/26/2021: The pt presents today for a follow up. He has undergone radiation therapy at [facility]. 5 day maximum dosage, TrueBeam radiation. On the 5th day he had some burning and his urination was like it had been before the TURP but those subsided. The pt feels great overall. He has some nocturia 1x but nothing bothersome. Denies any urinary urgency, hematuria, pushing, straining, diarrhea, constipation, chest pains. He has not undergone any hormone treatment after getting multiple opinions. His last PSA from 1/20/2021 was 1.88. He is done seeing the radiation oncologist for now, . He reports he recently had a bone scan ordered by  that he will have the results sent over. He reports that the bone scan was good. He does not got erections anymore but he does not have any interest right now. \par \par 05/24/2021: Patient presents today for follow up. Patient states that in October, he started to have burning on urination. He called his oncologist who told him to take Alleve which improved his symptoms, but symptoms returned a week later. Patient presents today to discuss these symptoms. Urination is satisfactory currently. Wakes 0-1x at night to urinate. Denies dysuria, pushing or straining, gross hematuria. Urinates 4-5x during the day. Notes that while he had burning urination, did drip at the end of urination. Notes that after a TURP 4/2018, erections became poor, but not interested in medication. PSA 5/10/21 was 1.15, which is decreased from 10.70 on 7/1/20 s/p TrueBeam radiation. States he had a [place] scan on [place] [facility] [place] showing no osseous metastasis. \par \par The patient produced a urine sample which will be sent for urinalysis, urine cytology, and urine culture. \par Blood work today included BMP, alkaline phosphatase, PSA, and testosterone. \par \par We suggested a one month visit, but patient declines and prefers to come in 3 months, but agrees to call if he new symptoms develop and needs to come to the office. \par \par Preparation, in-person office visit, and coordination of care took: 43 minutes

## 2021-05-24 NOTE — HISTORY OF PRESENT ILLNESS
[FreeTextEntry1] : 68 year old male presents for evaluation of test results.\par This pt has been on Flomax for many years . \par Nocturia x 1-2 \par Stream is weak \par Occasional intermittency \par He had Bx in the past, none of which were targeted.\par Uroflow 12/13/18: Voided volume of 55ml and PVR of 140ml. Peak flow of 7.1\par Repeat PSA marked increase in 2 years.\par PSA is now 9.46, last result from 12/13/18. \par MRI on 01/04/19 reported prostate volume of 95.7 cc and PIRADS-3.\par We will schedule pt for targeted prostate Bx. \par We will do a residual on him after his prostate Bx due to previous residual of 140 mL. \par \par 2/5/19: The patient returned today for an MRI/ultrasound fusion guided biopsy of the prostate. Was administered an Amikacin injection. Took Valium and Diazepam as directed. Last biopsy was years ago. Notes his urination is adequate. Has been taking Tamsulosin once daily. Inquired about an operation on the prostate. I advised the patient about his options of a laser enucleation or trying other medication. I discouraged the patient from considering a green light laser as the patient's prostate is larger than most that would undergo this procedure. We discussed the risks and benefits of both, as the patient will decide which route he wants to take in the future. Retired.\par 06/17/2020: Patient presents today for a follow up. Pt has moderate pain, but did not think it was serious enough to take pain killers. Has had three Bxs, three MRIs and a TURP in the past. TURP showed prostate tissue was benign. PSA on 09/12/18 was 9.46. On 01/28/2020, the PSA was 8.43. Last prostate Bx on 02/05/19. US today shows and infection of the testicles. US renal today shows epididymitis of the right epididymis, heterogenous in appearance and simple cyst in the head measuring 0.63 cm x 0.58 cm, nonvascular. Hydrocele on the right testes measuring 3.44 x 1.44 x 2.1 cm. On the left epididymis there is a septated cyst measuring 0.65 x 0.60 cm with no vascularity. Bilateral varicocele with vsm measuring 0.31 cm on the right and 0.29 cm on the left. \par \par 07/01/2020: Patient presents today for a follow up. PSA was 8.43 on 01/28/2020. US renal today shows improvement on the right side, but still some vascularity. No stones, tumors or cysts. Pain has gone away with Sulfamethoxazole- Trimethoprim. Reports testicle are tender and of uneven size. Pt had a prostate Bx scheduled, but it has been delayed. MRI prostate on 03/09/2020 showed prostate volume of 22cc and a suspicious lesion in the left anterior peripheral zone with a PIRADS score of 4. Pt reports no artificial parts, doesn't take aspirin, or blood thinners/inhibitor. No medical allergies \par \par 01/26/2021: The pt presents today for a follow up. He has undergone radiation therapy at [facility]. 5 day maximum dosage, TrueBeam radiation. On the 5th day he had some burning and his urination was like it had been before the TURP but those subsided. The pt feels great overall. He has some nocturia 1x but nothing bothersome. Denies any urinary urgency, hematuria, pushing, straining, diarrhea, constipation, chest pains. He has not undergone any hormone treatment after getting multiple opinions. His last PSA from 1/20/2021 was 1.88. He is done seeing the radiation oncologist for now, . He reports he recently had a bone scan  ordered by  that he will have the results sent over. He reports that the bone scan was good. He does not got erections anymore but he does not have any interest right now. \par \par 05/24/2021: Patient presents today for follow up. Patient states that in October, he started to have burning on urination. He called his oncologist who told him to take Alleve which improved his symptoms, but symptoms returned a week later. Patient presents today to discuss these symptoms. Urination is satisfactory currently. Wakes 0-1x at night to urinate. Denies dysuria, pushing or straining, gross hematuria. Urinates 4-5x during the day. Notes that while he had burning urination, did drip at the end of urination. Notes that after a TURP 4/2018, erections became poor, but not interested in medication. PSA 5/10/21 was 1.15, which is decreased from 10.70 on 7/1/20 s/p TrueBeam radiation. States [place] [facility] [place]seous [place].

## 2021-05-24 NOTE — PHYSICAL EXAM
[General Appearance - Well Developed] : well developed [Normal Appearance] : normal appearance [General Appearance - In No Acute Distress] : no acute distress [Abdomen Soft] : soft [Abdomen Tenderness] : non-tender [Edema] : no peripheral edema [] : no respiratory distress [Exaggerated Use Of Accessory Muscles For Inspiration] : no accessory muscle use [Respiration, Rhythm And Depth] : normal respiratory rhythm and effort [Oriented To Time, Place, And Person] : oriented to person, place, and time [Mood] : the mood was normal [Affect] : the affect was normal [Not Anxious] : not anxious [Normal Station and Gait] : the gait and station were normal for the patient's age [No Focal Deficits] : no focal deficits [FreeTextEntry1] : Wears reading glasses.

## 2021-05-24 NOTE — REVIEW OF SYSTEMS
[Feeling Poorly] : not feeling poorly [Feeling Tired] : not feeling tired [Chest Pain] : no chest pain [Constipation] : no constipation [Diarrhea] : no diarrhea [Dysuria] : no dysuria [Nocturia] : no nocturia [Burning Sensation] : no burning sensation during urination [Difficulty Walking] : no difficulty walking

## 2021-05-29 LAB
APPEARANCE: CLEAR
BACTERIA UR CULT: NORMAL
BACTERIA: NEGATIVE
BILIRUBIN URINE: NEGATIVE
BLOOD URINE: ABNORMAL
COLOR: YELLOW
GLUCOSE QUALITATIVE U: NEGATIVE
HYALINE CASTS: 2 /LPF
KETONES URINE: NEGATIVE
LEUKOCYTE ESTERASE URINE: ABNORMAL
MICROSCOPIC-UA: NORMAL
NITRITE URINE: NEGATIVE
PH URINE: 6
PROTEIN URINE: ABNORMAL
RED BLOOD CELLS URINE: 21 /HPF
SPECIFIC GRAVITY URINE: 1.02
SQUAMOUS EPITHELIAL CELLS: 1 /HPF
URINE CYTOLOGY: NORMAL
UROBILINOGEN URINE: NORMAL
WHITE BLOOD CELLS URINE: 12 /HPF

## 2021-06-10 ENCOUNTER — APPOINTMENT (OUTPATIENT)
Dept: UROLOGY | Facility: CLINIC | Age: 71
End: 2021-06-10

## 2021-09-07 ENCOUNTER — APPOINTMENT (OUTPATIENT)
Dept: UROLOGY | Facility: CLINIC | Age: 71
End: 2021-09-07

## 2021-09-20 LAB
ALP BLD-CCNC: 99 U/L
ANION GAP SERPL CALC-SCNC: 13 MMOL/L
BUN SERPL-MCNC: 22 MG/DL
CALCIUM SERPL-MCNC: 9.2 MG/DL
CHLORIDE SERPL-SCNC: 107 MMOL/L
CO2 SERPL-SCNC: 24 MMOL/L
CREAT SERPL-MCNC: 0.89 MG/DL
GLUCOSE SERPL-MCNC: 170 MG/DL
POTASSIUM SERPL-SCNC: 4.3 MMOL/L
SODIUM SERPL-SCNC: 145 MMOL/L

## 2021-09-21 ENCOUNTER — APPOINTMENT (OUTPATIENT)
Dept: UROLOGY | Facility: CLINIC | Age: 71
End: 2021-09-21
Payer: MEDICARE

## 2021-09-21 VITALS
WEIGHT: 195 LBS | HEART RATE: 62 BPM | RESPIRATION RATE: 17 BRPM | SYSTOLIC BLOOD PRESSURE: 128 MMHG | DIASTOLIC BLOOD PRESSURE: 75 MMHG | TEMPERATURE: 98 F | HEIGHT: 69 IN | BODY MASS INDEX: 28.88 KG/M2

## 2021-09-21 LAB
PSA SERPL-MCNC: 1.09 NG/ML
TESTOST SERPL-MCNC: 373 NG/DL

## 2021-09-21 PROCEDURE — 99212 OFFICE O/P EST SF 10 MIN: CPT

## 2021-09-21 NOTE — ASSESSMENT
[FreeTextEntry1] : The patient is a 71 year old male presenting today for gross hematuria on Saturday, Sunday, and Monday.\par He had some burning at the tip of his penis more or less at the same time. \par Today, he is no longer passing any clots, but has mild hematuria. \par Last year, he had radiation therapy (5 treatments) at [facility]. \par His repeat PSA was 1.09. \par \par I have scheduled him for a CT Urogram and cystoscopy next Monday. \par \par he will return to the office next Monday to review the CT and for the cystoscopy.\par \par I spent 15 minutes with the patient.

## 2021-09-21 NOTE — HISTORY OF PRESENT ILLNESS
[FreeTextEntry1] : The patient is a 71 year old male presenting today for gross hematuria on Saturday, Sunday, and Monday.\par He had some burning at the tip of his penis more or less at the same time. \par Today, he is no longer passing any clots, but has mild hematuria. \par Last year, he had radiation therapy (5 treatments) at [facility]. \par His repeat PSA was 1.09. \par \par I have scheduled him for a CT Urogram and cystoscopy next Monday. \par \par he will return to the office next Monday to review the CT and for the cystoscopy.

## 2021-09-21 NOTE — ADDENDUM
[FreeTextEntry1] : I, Ann Marie Torres, acted as the sole scribe for Dr. Joaquín Steve on 09/21/2021.

## 2021-09-23 ENCOUNTER — OUTPATIENT (OUTPATIENT)
Dept: OUTPATIENT SERVICES | Facility: HOSPITAL | Age: 71
LOS: 1 days | End: 2021-09-23
Payer: MEDICARE

## 2021-09-23 ENCOUNTER — APPOINTMENT (OUTPATIENT)
Dept: CT IMAGING | Facility: IMAGING CENTER | Age: 71
End: 2021-09-23
Payer: MEDICARE

## 2021-09-23 DIAGNOSIS — R31.0 GROSS HEMATURIA: ICD-10-CM

## 2021-09-23 DIAGNOSIS — Z98.890 OTHER SPECIFIED POSTPROCEDURAL STATES: Chronic | ICD-10-CM

## 2021-09-23 LAB
APPEARANCE: CLEAR
BACTERIA UR CULT: NORMAL
BACTERIA: NEGATIVE
BILIRUBIN URINE: NEGATIVE
BLOOD URINE: ABNORMAL
COLOR: YELLOW
GLUCOSE QUALITATIVE U: NEGATIVE
HYALINE CASTS: 5 /LPF
KETONES URINE: NEGATIVE
LEUKOCYTE ESTERASE URINE: ABNORMAL
MICROSCOPIC-UA: NORMAL
NITRITE URINE: NEGATIVE
PH URINE: 6
PROTEIN URINE: ABNORMAL
RED BLOOD CELLS URINE: 21 /HPF
SPECIFIC GRAVITY URINE: 1.02
SQUAMOUS EPITHELIAL CELLS: 2 /HPF
UROBILINOGEN URINE: NORMAL
WHITE BLOOD CELLS URINE: 50 /HPF

## 2021-09-23 PROCEDURE — 82565 ASSAY OF CREATININE: CPT

## 2021-09-23 PROCEDURE — 74178 CT ABD&PLV WO CNTR FLWD CNTR: CPT

## 2021-09-23 PROCEDURE — 74178 CT ABD&PLV WO CNTR FLWD CNTR: CPT | Mod: 26,MH

## 2021-09-28 ENCOUNTER — APPOINTMENT (OUTPATIENT)
Dept: UROLOGY | Facility: CLINIC | Age: 71
End: 2021-09-28
Payer: MEDICARE

## 2021-09-28 ENCOUNTER — OUTPATIENT (OUTPATIENT)
Dept: OUTPATIENT SERVICES | Facility: HOSPITAL | Age: 71
LOS: 1 days | End: 2021-09-28
Payer: MEDICARE

## 2021-09-28 VITALS
HEART RATE: 56 BPM | RESPIRATION RATE: 16 BRPM | TEMPERATURE: 97.5 F | SYSTOLIC BLOOD PRESSURE: 133 MMHG | DIASTOLIC BLOOD PRESSURE: 81 MMHG

## 2021-09-28 DIAGNOSIS — Z98.890 OTHER SPECIFIED POSTPROCEDURAL STATES: Chronic | ICD-10-CM

## 2021-09-28 DIAGNOSIS — R35.0 FREQUENCY OF MICTURITION: ICD-10-CM

## 2021-09-28 PROCEDURE — 99212 OFFICE O/P EST SF 10 MIN: CPT | Mod: 25

## 2021-09-28 PROCEDURE — 52000 CYSTOURETHROSCOPY: CPT

## 2021-09-28 NOTE — ADDENDUM
[FreeTextEntry1] : I, Ann Marie Torres, acted as the sole scribe for Dr. Joaquín Steve on 09/28/2021.

## 2021-09-28 NOTE — ASSESSMENT
[FreeTextEntry1] : The patient is a 71 year old presenting today for a cystoscopy indicated by gross hematuria. \par He had a TURP and excision of bladder neck diverticulum . The diverticulum was fulgurated. \par He also had radiation therapy for carcinoma of the prostate gland in October 2020. \par He is still experiencing pain in the tip of his penis, but hasn't noted passing any stones.\par When he drinks a lot of water, the pain subsides occasionally.  \par The hematuria subsided about 1.5 weeks ago. \par \par The CT Urogram from 9/23/21 demonstrated:\par FINDINGS:\par KIDNEYS/URETERS: Within normal limits. No gross urothelial lesion. Of note, the left proximal ureter is not opacified with contrast which limits evaluation. No focal dilatation or abnormal soft tissue is seen.\par \par BLADDER: No gross urothelial lesion. Cystic structure along the right side of the bladder posteriorly with high attenuation on the precontrast sequence. This increases in size slightly on the delayed image and may represent a bladder diverticulum containing stones. This does not entirely fill with contrast-the slightly limited in evaluation.\par REPRODUCTIVE ORGANS: Heterogeneous prostate gland. Defect superiorly. Has there been a prior TURP?\par \par BOWEL: No bowel obstruction. No evidence for appendicitis. Multiple colonic diverticuli predominantly in the descending and sigmoid colon without gross inflammation.\par \par IMPRESSION:\par No gross urothelial lesion identified. Presumed bladder diverticulum posterior and to the right containing stones. This does not fill with contrast on the delayed images which limits evaluation.\par \par In my opinion, this patient should have his diverticulum neck excised and the stones evacuated. I suggest he has an appointment with Dr. Valles in the near future. \par \par The patient should have a follow up with Dr. Valles. \par \par I spent 15 minutes with the patient.

## 2021-10-04 ENCOUNTER — APPOINTMENT (OUTPATIENT)
Dept: UROLOGY | Facility: CLINIC | Age: 71
End: 2021-10-04
Payer: MEDICARE

## 2021-10-04 VITALS
WEIGHT: 195 LBS | HEIGHT: 69 IN | DIASTOLIC BLOOD PRESSURE: 76 MMHG | RESPIRATION RATE: 17 BRPM | SYSTOLIC BLOOD PRESSURE: 131 MMHG | TEMPERATURE: 98 F | BODY MASS INDEX: 28.88 KG/M2 | HEART RATE: 70 BPM

## 2021-10-04 PROCEDURE — 99214 OFFICE O/P EST MOD 30 MIN: CPT

## 2021-10-04 NOTE — PHYSICAL EXAM
[General Appearance - Well Developed] : well developed [Skin Color & Pigmentation] : normal skin color and pigmentation [Edema] : no peripheral edema [] : no respiratory distress [Oriented To Time, Place, And Person] : oriented to person, place, and time [General Appearance - Well Nourished] : well nourished [Normal Appearance] : normal appearance [Well Groomed] : well groomed [General Appearance - In No Acute Distress] : no acute distress [Respiration, Rhythm And Depth] : normal respiratory rhythm and effort [Exaggerated Use Of Accessory Muscles For Inspiration] : no accessory muscle use [Affect] : the affect was normal [Mood] : the mood was normal [Not Anxious] : not anxious [Normal Station and Gait] : the gait and station were normal for the patient's age

## 2021-10-04 NOTE — HISTORY OF PRESENT ILLNESS
[None] : None [FreeTextEntry1] : 72y/o man\par s/p HOLEP and excision of bladder neck diverticulum . The diverticulum was fulgurated in April 2019\par He also had radiation therapy for carcinoma of the prostate gland in October 2020\par He is still experiencing pain in the tip of his penis\par When he drinks a lot of water, the pain subsides occasionally\par The hematuria subsided about 2 weeks ago\par \par He has penile pain and rectal pain which started during his radiation treatments for prostate cancer\par \par The CT Urogram from 9/23/21 demonstrated: Right sided Bladder diverticulum containing stones\par \par CT images reviewed and compared to MRI from preop: Right sided bladder diverticulum was much larger preop; notable significant size reduction of prostate size compared to MRI\par Discussed Cystoscopic and re-excision of bladder neck diverticulum, removal of stones and re-fulguration of diverticulum lining. Stones may need laser fragmentation.\par Also discussed open excision.\par \par Unclear if stones in diverticulum are causing irritation and therefore causing gross hematuria\par \par Suggest cystoscopic approach, and if it fails, consider open approach.\par \par Given the time course, his penile pain and rectal pain are more likely related to his history of radiation for prostate cancer rather than the diverticulum.

## 2021-10-13 DIAGNOSIS — N32.3 DIVERTICULUM OF BLADDER: ICD-10-CM

## 2022-01-06 ENCOUNTER — OUTPATIENT (OUTPATIENT)
Dept: OUTPATIENT SERVICES | Facility: HOSPITAL | Age: 72
LOS: 1 days | End: 2022-01-06
Payer: MEDICARE

## 2022-01-06 VITALS
SYSTOLIC BLOOD PRESSURE: 116 MMHG | OXYGEN SATURATION: 96 % | HEART RATE: 74 BPM | DIASTOLIC BLOOD PRESSURE: 79 MMHG | TEMPERATURE: 98 F | HEIGHT: 69 IN | WEIGHT: 184.97 LBS | RESPIRATION RATE: 16 BRPM

## 2022-01-06 DIAGNOSIS — Z98.890 OTHER SPECIFIED POSTPROCEDURAL STATES: Chronic | ICD-10-CM

## 2022-01-06 DIAGNOSIS — N21.0 CALCULUS IN BLADDER: ICD-10-CM

## 2022-01-06 DIAGNOSIS — Z90.79 ACQUIRED ABSENCE OF OTHER GENITAL ORGAN(S): Chronic | ICD-10-CM

## 2022-01-06 DIAGNOSIS — N32.3 DIVERTICULUM OF BLADDER: ICD-10-CM

## 2022-01-06 DIAGNOSIS — Z01.818 ENCOUNTER FOR OTHER PREPROCEDURAL EXAMINATION: ICD-10-CM

## 2022-01-06 PROCEDURE — 85027 COMPLETE CBC AUTOMATED: CPT

## 2022-01-06 PROCEDURE — 36415 COLL VENOUS BLD VENIPUNCTURE: CPT

## 2022-01-06 PROCEDURE — 80048 BASIC METABOLIC PNL TOTAL CA: CPT

## 2022-01-06 PROCEDURE — 87086 URINE CULTURE/COLONY COUNT: CPT

## 2022-01-06 PROCEDURE — G0463: CPT

## 2022-01-06 RX ORDER — CEFAZOLIN SODIUM 1 G
2000 VIAL (EA) INJECTION ONCE
Refills: 0 | Status: DISCONTINUED | OUTPATIENT
Start: 2022-01-13 | End: 2022-01-13

## 2022-01-06 RX ORDER — SODIUM CHLORIDE 9 MG/ML
3 INJECTION INTRAMUSCULAR; INTRAVENOUS; SUBCUTANEOUS EVERY 8 HOURS
Refills: 0 | Status: DISCONTINUED | OUTPATIENT
Start: 2022-01-13 | End: 2022-01-13

## 2022-01-06 RX ORDER — LIDOCAINE HCL 20 MG/ML
0.2 VIAL (ML) INJECTION ONCE
Refills: 0 | Status: DISCONTINUED | OUTPATIENT
Start: 2022-01-13 | End: 2022-01-13

## 2022-01-06 NOTE — H&P PST ADULT - NSICDXPASTMEDICALHX_GEN_ALL_CORE_FT
PAST MEDICAL HISTORY:  BPH with elevated PSA     Calculus of bladder     Calculus of bladder diverticulum     Hematuria     [place] (hard of hearing) [employer] hearing aids    Hypothyroid      activity [id number]  [employer]  flew planes   [place]  [place]      Prostate cancer March 2020  radiation  last 11/2020  "True Beam"

## 2022-01-06 NOTE — H&P PST ADULT - FALL HARM RISK - UNIVERSAL INTERVENTIONS
Bed in lowest position, wheels locked, appropriate side rails in place/Call bell, personal items and telephone in reach/Instruct patient to call for assistance before getting out of bed or chair/Non-slip footwear when patient is out of bed/[place] to call system/Physically safe environment - no spills, clutter or unnecessary equipment/Purposeful Proactive Rounding/Room/bathroom lighting operational, light cord in reach

## 2022-01-06 NOTE — H&P PST ADULT - ASSESSMENT
calculus in  bladder  diverticulum of bladder  CAPRINI SCORE [CLOT]    AGE RELATED RISK FACTORS                                                       MOBILITY RELATED FACTORS  [ ] Age 41-60 years                                            (1 Point)                  [ ] Bed rest                                                        (1 Point)  [ x] Age: 61-74 years                                           (2 Points)                 [ ] Plaster cast                                                   (2 Points)  [ ] Age= 75 years                                              (3 Points)                 [ ] Bed bound for more than 72 hours                 (2 Points)    DISEASE RELATED RISK FACTORS                                               GENDER SPECIFIC FACTORS  [ ] Edema in the lower extremities                       (1 Point)                  [ ] Pregnancy                                                     (1 Point)  [ ] Varicose veins                                               (1 Point)                  [ ] Post-partum < 6 weeks                                   (1 Point)             [ x] BMI > 25 Kg/m2                                            (1 Point)                  [ ] Hormonal therapy  or oral contraception          (1 Point)                 [ ] Sepsis (in the previous month)                        (1 Point)                  [ ] History of pregnancy complications                 (1 point)  [ ] Pneumonia or serious lung disease                                               [ ] Unexplained or recurrent                     (1 Point)           (in the previous month)                               (1 Point)  [ ] Abnormal pulmonary function test                     (1 Point)                 SURGERY RELATED RISK FACTORS  [ ] Acute myocardial infarction                              (1 Point)                 [ ]  Section                                             (1 Point)  [ ] Congestive heart failure (in the previous month)  (1 Point)               [ ] Minor surgery                                                  (1 Point)   [ ] Inflammatory bowel disease                             (1 Point)                 [ ] Arthroscopic surgery                                        (2 Points)  [ ] Central venous access                                      (2 Points)                [x ] General surgery lasting more than 45 minutes   (2 Points)       [ ] Stroke (in the previous month)                          (5 Points)               [ ] Elective arthroplasty                                         (5 Points)                                                                                                                                               HEMATOLOGY RELATED FACTORS                                                 TRAUMA RELATED RISK FACTORS  [ ] Prior episodes of VTE                                     (3 Points)                [ ] Fracture of the hip, pelvis, or leg                       (5 Points)  [ ] Positive family history for VTE                         (3 Points)                 [ ] Acute spinal cord injury (in the previous month)  (5 Points)  [ ] Prothrombin 40476 A                                     (3 Points)                 [ ] Paralysis  (less than 1 month)                             (5 Points)  [ ] Factor V Leiden                                             (3 Points)                  [ ] Multiple Trauma within 1 month                        (5 Points)  [ ] Lupus anticoagulants                                     (3 Points)                                                           [ ] Anticardiolipin antibodies                               (3 Points)                                                       [ ] High homocysteine in the blood                      (3 Points)                                             [ ] Other congenital or acquired thrombophilia      (3 Points)                                                [ ] Heparin induced thrombocytopenia                  (3 Points)                                          Total Score [    5      ]    Caprini Score 0 - 2:  Low Risk, No VTE Prophylaxis required for most patients, encourage ambulation  Caprini Score 3 - 6:  At Risk, pharmacologic VTE prophylaxis is indicated for most patients (in the absence of a contraindication)  Caprini Score Greater than or = 7:  High Risk, pharmacologic VTE prophylaxis is indicated for most patients (in the absence of a contraindication)

## 2022-01-06 NOTE — H&P PST ADULT - VENOUS THROMBOEMBOLISM FOR WOMEN ONLY
(0) indicator not present Detail Level: Detailed Number Of Curettages: 3 Size Of Lesion In Cm: 1 Size Of Lesion After Curettage: 1.3 Add Intralesional Injection: No Concentration (Mg/Ml Or Millions Of Plaque Forming Units/Cc): 0.01 Anesthesia Type: 1% lidocaine without epinephrine Cautery Type: drysol What Was Performed First?: Curettage Final Size Statement: The size of the lesion after curettage was Additional Information: (Optional): The wound was cleaned, and a pressure dressing was applied.  The patient received detailed post-op instructions. Consent: verbal Consent was obtained from the patient. The risks, benefits and alternatives to therapy were discussed in detail. Specifically, the risks of infection, scarring, bleeding, prolonged wound healing, nerve injury, incomplete removal, allergy to anesthesia and recurrence were addressed. Alternatives to ED&C, such as: surgical removal and XRT were also discussed.  Prior to the procedure, the treatment site was clearly identified and confirmed by the patient. All components of Universal Protocol/PAUSE Rule completed. Render Post-Care Instructions In Note?: yes Post-Care Instructions: I reviewed with the patient in detail post-care instructions. Patient is to keep the area dry for 24 hours, and not to engage in any swimming until the area is healed. Should the patient develop any fevers, chills, bleeding, severe pain patient will contact the office immediately. Bill As A Line Item Or As Units: Line Item

## 2022-01-06 NOTE — H&P PST ADULT - PROBLEM SELECTOR PLAN 1
cystoscopy  re excision of bladder neck diverticulum  removal of stones  re fulguration  of diverticulum lining stones   laser fragmentation

## 2022-01-06 NOTE — H&P PST ADULT - NSICDXPASTSURGICALHX_GEN_ALL_CORE_FT
PAST SURGICAL HISTORY:  H/O colonoscopy 2/2019    S/P cystoscopy 4/2/2018  for stone    S/P TURP 4/2/2018

## 2022-01-06 NOTE — H&P PST ADULT - HISTORY OF PRESENT ILLNESS
71 year old male with hx of Prostate Cancer, hypothyroidism noted hematuria workup dx with bladder calculus and diverticulum of bladder for procedure.    ***COVID**  denies  travel outside [place]  denies known exposure  denies s/s  denies vaccination  swab 1/10/22  Vinayak

## 2022-01-10 ENCOUNTER — OUTPATIENT (OUTPATIENT)
Dept: OUTPATIENT SERVICES | Facility: HOSPITAL | Age: 72
LOS: 1 days | End: 2022-01-10

## 2022-01-10 DIAGNOSIS — Z98.890 OTHER SPECIFIED POSTPROCEDURAL STATES: Chronic | ICD-10-CM

## 2022-01-10 DIAGNOSIS — Z11.52 ENCOUNTER FOR SCREENING FOR COVID-19: ICD-10-CM

## 2022-01-10 DIAGNOSIS — Z90.79 ACQUIRED ABSENCE OF OTHER GENITAL ORGAN(S): Chronic | ICD-10-CM

## 2022-01-10 LAB — SARS-COV-2 RNA SPEC QL NAA+PROBE: SIGNIFICANT CHANGE UP

## 2022-01-12 ENCOUNTER — TRANSCRIPTION ENCOUNTER (OUTPATIENT)
Age: 72
End: 2022-01-12

## 2022-01-12 NOTE — ASSESSMENT
[FreeTextEntry1] : 2/5/19: The patient returned today for an MRI/ultrasound fusion guided biopsy of the prostate. Was administered an Amikacin injection. Took Valium and Diazepam as directed. Last biopsy was years ago. Notes his urination is adequate. Has been taking Tamsulosin once daily. Inquired about an operation on the prostate. I advised the patient about his options of a laser enucleation or trying other medication. I discouraged the patient from considering a green light laser as the patient's prostate is larger than most that would undergo this procedure. We discussed the risks and benefits of both, as the patient will decide which route he wants to take in the future. Retired.\par Biopsy: I parted the butt cheeks. Placed the probe in the rectum. Lidocaine jelly used. Measured the prostate volume. Swept from bladder down to the perineum. During the biopsy, there were technical difficulties. After some time, the technical difficulties were fixed and we continued with the procedure. We took four biopsies from the targeted lesion as well as other sampling biopsies. We will send the biopsies to pathology.\par I advised the patient to drink lots of water and that he should expect blood from his urine and stool, but it will resolve. The patient will return in one week for a follow up with Dr. Steve to discuss prostate biopsy results. \par 06/17/2020: Patient presents today for a follow up. Pt has moderate pain, but did not think it was serious enough to take pain killers. Has had three Bxs, three MRIs and a TURP in the past. TURP showed prostate tissue was benign. PSA on 09/12/18 was 9.46. On 01/28/2020, the PSA was 8.43. Last prostate Bx on 02/05/19. US today shows and infection of the testicles. US renal today shows epididymitis of the right epididymis, heterogenous in appearance and simple cyst in the head measuring 0.63 cm x 0.58 cm, nonvascular. Hydrocele on the right testes measuring 3.44 x 1.44 x 2.1 cm. On the left epididymis there is a septated cyst measuring 0.65 x 0.60 cm with no vascularity. Bilateral varicocele with vsm measuring 0.31 cm on the right and 0.29 cm on the left. Right side scrotal skin is slightly swollen and red. Pt can touch scrotum without undo pain. Left testicle, epididymis and spermatic cord is non tender, no masses. Hydrocele on right testes which is tender. Right epididymis is more tender. Spermatic cord is also tender. Prescribed Sulfamethoxazole- Trimethoprim BID with food for 2 weeks. Advised pt to avoid exercise and sex while on the pill. Pt will provide a urine sample today for culture, cytology and analysis. Scheduled repeat US testis. RTO in 2 weeks for US renal \par \par 07/01/2020: Patient presents today for a follow up. PSA was 8.43 on 01/28/2020. US renal today shows improvement on the right side, but still some vascularity. No stones, tumors or cysts. Pain has gone away with Sulfamethoxazole- Trimethoprim. Reports testicle are tender and of uneven size. Pt had a prostate Bx scheduled, but it has been delayed. MRI prostate on 03/09/2020 showed prostate volume of 22cc and a suspicious lesion in the left anterior peripheral zone with a PIRADS score of 4. Pt reports no artificial parts, doesn't take aspirin, or blood thinners/inhibitor. No medical allergies \par \par Pt will provide a urine sample today for culture, cytology and analysis. Bloodwork today will include ALK PHOS, BMP PSA and testosterone. \par \par Pt will have transperineal fusion prostate Bx with local anesthesia. Risks and benefits explained. Alternative of having biopsy under anesthesia offered and explained. Alternative of observation without biopsy at this time discussed. If pt is found to have low grade cancer, it will be watched. If it is aggressive, he will have surgical removal of the prostate or radiation. He will take Valium 10 mg 1 hour before the procedure. \par \par RTO in 1-4 weeks.
26.2

## 2022-01-13 ENCOUNTER — OUTPATIENT (OUTPATIENT)
Dept: INPATIENT UNIT | Facility: HOSPITAL | Age: 72
LOS: 1 days | End: 2022-01-13
Payer: MEDICARE

## 2022-01-13 ENCOUNTER — RESULT REVIEW (OUTPATIENT)
Age: 72
End: 2022-01-13

## 2022-01-13 ENCOUNTER — APPOINTMENT (OUTPATIENT)
Dept: UROLOGY | Facility: HOSPITAL | Age: 72
End: 2022-01-13

## 2022-01-13 VITALS
OXYGEN SATURATION: 97 % | SYSTOLIC BLOOD PRESSURE: 126 MMHG | HEART RATE: 69 BPM | RESPIRATION RATE: 16 BRPM | DIASTOLIC BLOOD PRESSURE: 55 MMHG

## 2022-01-13 VITALS
TEMPERATURE: 98 F | RESPIRATION RATE: 16 BRPM | WEIGHT: 184.97 LBS | DIASTOLIC BLOOD PRESSURE: 67 MMHG | HEART RATE: 65 BPM | SYSTOLIC BLOOD PRESSURE: 101 MMHG | OXYGEN SATURATION: 98 % | HEIGHT: 69 IN

## 2022-01-13 DIAGNOSIS — N32.3 DIVERTICULUM OF BLADDER: ICD-10-CM

## 2022-01-13 DIAGNOSIS — Z90.79 ACQUIRED ABSENCE OF OTHER GENITAL ORGAN(S): Chronic | ICD-10-CM

## 2022-01-13 DIAGNOSIS — Z98.890 OTHER SPECIFIED POSTPROCEDURAL STATES: Chronic | ICD-10-CM

## 2022-01-13 DIAGNOSIS — N21.0 CALCULUS IN BLADDER: ICD-10-CM

## 2022-01-13 PROCEDURE — U0005: CPT

## 2022-01-13 PROCEDURE — 88300 SURGICAL PATH GROSS: CPT | Mod: 26

## 2022-01-13 PROCEDURE — U0003: CPT

## 2022-01-13 PROCEDURE — 52317 REMOVE BLADDER STONE: CPT

## 2022-01-13 PROCEDURE — C9803: CPT

## 2022-01-13 PROCEDURE — 52305 CYSTOSCOPY AND TREATMENT: CPT

## 2022-01-13 PROCEDURE — 88300 SURGICAL PATH GROSS: CPT

## 2022-01-13 PROCEDURE — 52601 PROSTATECTOMY (TURP): CPT

## 2022-01-13 PROCEDURE — 82365 CALCULUS SPECTROSCOPY: CPT

## 2022-01-13 RX ORDER — ONDANSETRON 8 MG/1
4 TABLET, FILM COATED ORAL ONCE
Refills: 0 | Status: DISCONTINUED | OUTPATIENT
Start: 2022-01-13 | End: 2022-01-13

## 2022-01-13 RX ORDER — HYDROMORPHONE HYDROCHLORIDE 2 MG/ML
0.5 INJECTION INTRAMUSCULAR; INTRAVENOUS; SUBCUTANEOUS
Refills: 0 | Status: DISCONTINUED | OUTPATIENT
Start: 2022-01-13 | End: 2022-01-13

## 2022-01-13 RX ORDER — OXYBUTYNIN CHLORIDE 5 MG
1 TABLET ORAL
Qty: 21 | Refills: 0
Start: 2022-01-13 | End: 2022-01-19

## 2022-01-13 RX ORDER — HYDROMORPHONE HYDROCHLORIDE 2 MG/ML
0.25 INJECTION INTRAMUSCULAR; INTRAVENOUS; SUBCUTANEOUS
Refills: 0 | Status: DISCONTINUED | OUTPATIENT
Start: 2022-01-13 | End: 2022-01-13

## 2022-01-13 NOTE — ASU DISCHARGE PLAN (ADULT/PEDIATRIC) - NS MD DC FALL RISK RISK
For information on Fall & Injury Prevention, visit: [email or web address] OR  [email or web address] OR  https://www.cdc.gov/steadi/patient.html

## 2022-01-13 NOTE — BRIEF OPERATIVE NOTE - OPERATION/FINDINGS
Cystoscopy, incision of neck of diverticulum, fulguration of diverticulum, removal of bladder stones, fulguration of prostate

## 2022-01-13 NOTE — ASU DISCHARGE PLAN (ADULT/PEDIATRIC) - CARE PROVIDER_API CALL
Domonique Valles)  Urology  [address] [place] [address]  Phone: [phone]  Fax: [phone]  Follow Up Time:

## 2022-01-13 NOTE — ASU DISCHARGE PLAN (ADULT/PEDIATRIC) - CALL YOUR DOCTOR IF YOU HAVE ANY OF THE FOLLOWING:
Bleeding that does not stop/Swelling that gets worse/Pain not relieved by Medications/Fever greater than (need to indicate Fahrenheit or Celsius)/Nausea and vomiting that does not stop/Unable to urinate/Inability to tolerate liquids or foods

## 2022-01-13 NOTE — PATIENT PROFILE ADULT - FALL HARM RISK - UNIVERSAL INTERVENTIONS
Bed in lowest position, wheels locked, appropriate side rails in place/Call bell, personal items and telephone in reach/Instruct patient to call for assistance before getting out of bed or chair/Non-slip footwear when patient is out of bed/[place] to call system/Physically safe environment - no spills, clutter or unnecessary equipment/Purposeful Proactive Rounding/Room/bathroom lighting operational, light cord in reach

## 2022-01-13 NOTE — ASU DISCHARGE PLAN (ADULT/PEDIATRIC) - ASU DC SPECIAL INSTRUCTIONSFT
You may take over the counter pain medicine as needed for pain.   Oxybutynin has been prescribed for bladder spasms. Please do not take oxybutynin within 24 hours of schedule montalvo removal.    Please complete course of antibiotics as prescribed.     You may have intermittent blood tinged urine. This is normal and due to your procedure. Please call the office if your urine becomes bright red, with clots, or stops draining.    Please followup with Dr. Valles or Dr. Steve next week. Please call to schedule an appointment.

## 2022-01-14 ENCOUNTER — NON-APPOINTMENT (OUTPATIENT)
Age: 72
End: 2022-01-14

## 2022-01-14 RX ORDER — DIAZEPAM 5 MG/1
5 TABLET ORAL
Qty: 1 | Refills: 0 | Status: DISCONTINUED | COMMUNITY
Start: 2020-03-11 | End: 2022-01-14

## 2022-01-14 RX ORDER — SULFAMETHOXAZOLE AND TRIMETHOPRIM 800; 160 MG/1; MG/1
800-160 TABLET ORAL
Qty: 28 | Refills: 0 | Status: DISCONTINUED | COMMUNITY
Start: 2020-06-17 | End: 2022-01-14

## 2022-01-14 RX ORDER — LEVOFLOXACIN 500 MG/1
500 TABLET, FILM COATED ORAL DAILY
Qty: 14 | Refills: 0 | Status: DISCONTINUED | COMMUNITY
Start: 2020-07-21 | End: 2022-01-14

## 2022-01-14 RX ORDER — DIAZEPAM 10 MG/1
10 TABLET ORAL
Qty: 3 | Refills: 0 | Status: DISCONTINUED | COMMUNITY
Start: 2020-07-01 | End: 2022-01-14

## 2022-01-18 LAB — NIDUS STONE QN: SIGNIFICANT CHANGE UP

## 2022-01-19 ENCOUNTER — APPOINTMENT (OUTPATIENT)
Dept: UROLOGY | Facility: CLINIC | Age: 72
End: 2022-01-19
Payer: MEDICARE

## 2022-01-19 ENCOUNTER — APPOINTMENT (OUTPATIENT)
Dept: UROLOGY | Facility: CLINIC | Age: 72
End: 2022-01-19

## 2022-01-19 PROCEDURE — 99212 OFFICE O/P EST SF 10 MIN: CPT

## 2022-01-19 NOTE — ADDENDUM
[FreeTextEntry1] : I, Ann Marie Torres, acted as the sole scribe for Dr. Joaquín Steve on 01/19/2022.

## 2022-01-19 NOTE — ASSESSMENT
[FreeTextEntry1] : Patient is a 71 year old male presenting for catheter removal. \par On 1/13/22, he had a cystoscopy, resection of the neck of bladder diverticulum, fragmentation and removal of bladder stones, cauterization of lining of bladder diverticulum, resection and cauterization of inflamed prostatic tissue, and placement of Nur catheter with Dr. Valles.\par He is complaining of discomfort on his penis. \par He finished a course of Amoxicillin last night. He is also taking Oxybutynin and Pyridium. He was previously taking Ketorolac, but stopped taking it a few days ago. \par \par Today, I removed the Nur catheter. I encouraged him to drink plenty of fluids for the next few days. He should not lift any heavy objects for the next few weeks. \par I recommend he discontinue the Oxybutynin. If the burning persists, he can continue taking the Pyridium, but otherwise he should stop taking it. \par \par The patient should follow up with Dr. Valles in one month, sooner if needed. \par \par I spent 15 minutes with the patient.

## 2022-01-19 NOTE — HISTORY OF PRESENT ILLNESS
[FreeTextEntry1] : Patient is a 71 year old male presenting for catheter removal. \par On 1/13/22, he had a cystoscopy, resection of the neck of bladder diverticulum, fragmentation and removal of bladder stones, cauterization of lining of bladder diverticulum, resection and cauterization of inflamed prostatic tissue, and placement of Nur catheter with Dr. Valles.\par He is complaining of discomfort on his penis. \par He finished a course of Amoxicillin last night. He is also taking Oxybutynin and Pyridium. He was previously taking Ketorolac, but stopped taking it a few days ago. \par \par Today, I removed the Nur catheter. I encouraged him to drink plenty of fluids for the next few days. He should not lift any heavy objects for the next few weeks. \par I recommend he discontinue the Oxybutynin. If the burning persists, he can continue taking the Pyridium, but otherwise he should stop taking it. \par \par The patient should follow up with Dr. Valles in one month, sooner if needed.

## 2022-02-10 LAB — PSA SERPL-MCNC: 0.57 NG/ML

## 2022-02-14 ENCOUNTER — APPOINTMENT (OUTPATIENT)
Dept: UROLOGY | Facility: CLINIC | Age: 72
End: 2022-02-14
Payer: MEDICARE

## 2022-02-14 VITALS
WEIGHT: 195 LBS | DIASTOLIC BLOOD PRESSURE: 77 MMHG | SYSTOLIC BLOOD PRESSURE: 132 MMHG | RESPIRATION RATE: 17 BRPM | HEIGHT: 69 IN | HEART RATE: 58 BPM | BODY MASS INDEX: 28.88 KG/M2 | TEMPERATURE: 98 F

## 2022-02-14 LAB
APPEARANCE: CLEAR
BACTERIA UR CULT: NORMAL
BACTERIA: NEGATIVE
BILIRUBIN URINE: NEGATIVE
BLOOD URINE: ABNORMAL
COLOR: NORMAL
GLUCOSE QUALITATIVE U: NEGATIVE
HYALINE CASTS: 1 /LPF
KETONES URINE: NEGATIVE
LEUKOCYTE ESTERASE URINE: ABNORMAL
MICROSCOPIC-UA: NORMAL
NITRITE URINE: NEGATIVE
PH URINE: 6
PROTEIN URINE: ABNORMAL
RED BLOOD CELLS URINE: 14 /HPF
SPECIFIC GRAVITY URINE: 1.01
SQUAMOUS EPITHELIAL CELLS: 1 /HPF
UROBILINOGEN URINE: NORMAL
WHITE BLOOD CELLS URINE: 73 /HPF

## 2022-02-14 PROCEDURE — 99213 OFFICE O/P EST LOW 20 MIN: CPT

## 2022-02-14 RX ORDER — KETOROLAC TROMETHAMINE 10 MG/1
10 TABLET, FILM COATED ORAL EVERY 6 HOURS
Qty: 28 | Refills: 0 | Status: COMPLETED | COMMUNITY
Start: 2022-01-14 | End: 2022-02-14

## 2022-02-14 RX ORDER — PHENAZOPYRIDINE HYDROCHLORIDE 200 MG/1
200 TABLET ORAL 3 TIMES DAILY
Qty: 21 | Refills: 0 | Status: COMPLETED | COMMUNITY
Start: 2019-03-26 | End: 2022-02-14

## 2022-02-14 RX ORDER — OXYBUTYNIN CHLORIDE 5 MG/1
5 TABLET ORAL
Qty: 21 | Refills: 0 | Status: COMPLETED | COMMUNITY
Start: 2022-01-13

## 2022-02-14 NOTE — ASSESSMENT
[FreeTextEntry1] : Recommend trial of oxybutynin\par f/u 3 months\par \par will refer to Dr. Diaz/BRENDA Davalos if no improvement\par \par PSA next visit

## 2022-02-14 NOTE — HISTORY OF PRESENT ILLNESS
[FreeTextEntry1] : see notes from Dr. Steve\par pt is s/p cystoscopy, resection of the neck of bladder diverticulum, fragmentation and removal of bladder diverticulum stones, cauterization of lining of bladder diverticulum, resection and cauterization of inflamed prostatic tissue on 1/13/22\par \par he had been on pyridium and oxybutynin for bladder spasms associated with Nur catheter\par \par patient's symptoms persist--constant burning at tip of penis and bladder spasms, feels "stress" in his pelvis/perineum. he feels relief of the pressure after  having a bowel movement\par \par he prefers not to take medications if possible\par \par he stopped oxybutynin\par \par \par PSA hx\par 0.57 feb 2022\par 1.09 sep 2021\par 1.15 may 2021\par 1.88 jan 2021 post radiation\par 10.70 jul 2020\par 8.43 jan 2020\par 7.95 jul 2019\par 9.46 dec 2018\par 7.86 apr 2017\par 8.2 sep 2016\par

## 2022-03-28 LAB — SURGICAL PATHOLOGY STUDY: SIGNIFICANT CHANGE UP

## 2022-04-11 RX ORDER — PREGABALIN 225 MG/1
3 CAPSULE ORAL
Qty: 0 | Refills: 0 | DISCHARGE

## 2022-04-11 RX ORDER — LEVOTHYROXINE SODIUM 125 MCG
1 TABLET ORAL
Qty: 0 | Refills: 0 | DISCHARGE

## 2022-04-11 RX ORDER — TAMSULOSIN HYDROCHLORIDE 0.4 MG/1
1 CAPSULE ORAL
Qty: 0 | Refills: 0 | DISCHARGE

## 2022-05-23 ENCOUNTER — APPOINTMENT (OUTPATIENT)
Dept: UROLOGY | Facility: CLINIC | Age: 72
End: 2022-05-23
Payer: MEDICARE

## 2022-05-23 VITALS
BODY MASS INDEX: 28.88 KG/M2 | WEIGHT: 195 LBS | RESPIRATION RATE: 17 BRPM | SYSTOLIC BLOOD PRESSURE: 134 MMHG | TEMPERATURE: 98.2 F | HEART RATE: 86 BPM | HEIGHT: 69 IN | DIASTOLIC BLOOD PRESSURE: 77 MMHG

## 2022-05-23 PROBLEM — H91.90 UNSPECIFIED HEARING LOSS, UNSPECIFIED EAR: Chronic | Status: ACTIVE | Noted: 2019-03-26

## 2022-05-23 PROBLEM — N40.0 BENIGN PROSTATIC HYPERPLASIA WITHOUT LOWER URINARY TRACT SYMPTOMS: Chronic | Status: ACTIVE | Noted: 2019-03-26

## 2022-05-23 PROBLEM — C61 MALIGNANT NEOPLASM OF PROSTATE: Chronic | Status: ACTIVE | Noted: 2022-01-06

## 2022-05-23 PROBLEM — N21.0 CALCULUS IN BLADDER: Chronic | Status: ACTIVE | Noted: 2022-01-06
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
## 2022-05-23 PROBLEM — R31.9 HEMATURIA, UNSPECIFIED: Chronic | Status: ACTIVE | Noted: 2022-01-06

## 2022-05-23 PROBLEM — E03.9 HYPOTHYROIDISM, UNSPECIFIED: Chronic | Status: ACTIVE | Noted: 2019-03-26

## 2022-05-23 LAB
APPEARANCE: CLEAR
BACTERIA: NEGATIVE
BILIRUBIN URINE: NEGATIVE
BLOOD URINE: NORMAL
COLOR: NORMAL
GLUCOSE QUALITATIVE U: NEGATIVE
HYALINE CASTS: 1 /LPF
KETONES URINE: NEGATIVE
LEUKOCYTE ESTERASE URINE: ABNORMAL
MICROSCOPIC-UA: NORMAL
NITRITE URINE: NEGATIVE
PH URINE: 6
PROTEIN URINE: ABNORMAL
PSA FREE FLD-MCNC: 8 %
PSA FREE SERPL-MCNC: 0.04 NG/ML
PSA SERPL-MCNC: 0.46 NG/ML
RED BLOOD CELLS URINE: 2 /HPF
SPECIFIC GRAVITY URINE: 1.01
SQUAMOUS EPITHELIAL CELLS: 1 /HPF
UROBILINOGEN URINE: NORMAL
WHITE BLOOD CELLS URINE: 41 /HPF

## 2022-05-23 PROCEDURE — 99214 OFFICE O/P EST MOD 30 MIN: CPT

## 2022-05-23 RX ORDER — OXYBUTYNIN CHLORIDE 10 MG/1
10 TABLET, EXTENDED RELEASE ORAL DAILY
Qty: 30 | Refills: 6 | Status: COMPLETED | COMMUNITY
Start: 2022-02-14 | End: 2022-05-23

## 2022-05-23 NOTE — ASSESSMENT
[FreeTextEntry1] : Doing better\par PSA stable\par Follow up 3 months\par \par Refer to Dr. Diaz/BRENDA Davalos if he worsens

## 2022-05-23 NOTE — HISTORY OF PRESENT ILLNESS
[FreeTextEntry1] : Pt here for follow up\par \par patient's symptoms persist but much improved--constant burning at tip of penis and bladder spasms, feels "stress" in his pelvis/perineum. he feels relief of the pressure after  having a bowel movement\par \par he stopped oxybutynin and pyridium\par symptoms described above are much better\par c/o bright yellow color to urine\par takes synthroid and several supplements\par \par UA 40 WBCs\par \par PVR today = 0cc\par \par PSA hx\par 0.46 may 2022\par 0.57 feb 2022\par 1.09 sep 2021\par 1.15 may 2021\par 1.88 jan 2021 post radiation\par 10.70 jul 2020\par 8.43 jan 2020\par 7.95 jul 2019\par 9.46 dec 2018\par 7.86 apr 2017\par 8.2 sep 2016\par

## 2022-06-29 NOTE — HISTORY OF PRESENT ILLNESS
no edema, no murmurs, regular rate and rhythm [FreeTextEntry1] : The patient is a 71 year old presenting today for a cystoscopy indicated by gross hematuria. \par He had a TURP and excision of bladder neck diverticulum . The diverticulum was fulgurated. \par He also had radiation therapy for carcinoma of the prostate gland in October 2020. \par He is still experiencing pain in the tip of his penis, but hasn't noted passing any stones.\par When he drinks a lot of water, the pain subsides occasionally.  \par The hematuria subsided about 1.5 weeks ago. \par \par The CT Urogram from 9/23/21 demonstrated:\par FINDINGS:\par KIDNEYS/URETERS: Within normal limits. No gross urothelial lesion. Of note, the left proximal ureter is not opacified with contrast which limits evaluation. No focal dilatation or abnormal soft tissue is seen.\par \par BLADDER: No gross urothelial lesion. Cystic structure along the right side of the bladder posteriorly with high attenuation on the precontrast sequence. This increases in size slightly on the delayed image and may represent a bladder diverticulum containing stones. This does not entirely fill with contrast-the slightly limited in evaluation.\par REPRODUCTIVE ORGANS: Heterogeneous prostate gland. Defect superiorly. Has there been a prior TURP?\par \par BOWEL: No bowel obstruction. No evidence for appendicitis. Multiple colonic diverticuli predominantly in the descending and sigmoid colon without gross inflammation.\par \par IMPRESSION:\par No gross urothelial lesion identified. Presumed bladder diverticulum posterior and to the right containing stones. This does not fill with contrast on the delayed images which limits evaluation.\par \par In my opinion, this patient should have his diverticulum neck excised and the stones evacuated. I suggest he has an appointment with Dr. Valles in the near future. \par \par The patient should have a follow up with Dr. Valles.

## 2022-10-10 ENCOUNTER — APPOINTMENT (OUTPATIENT)
Dept: UROLOGY | Facility: CLINIC | Age: 72
End: 2022-10-10

## 2022-10-10 VITALS
BODY MASS INDEX: 29.03 KG/M2 | HEART RATE: 56 BPM | SYSTOLIC BLOOD PRESSURE: 115 MMHG | DIASTOLIC BLOOD PRESSURE: 77 MMHG | WEIGHT: 196 LBS | RESPIRATION RATE: 17 BRPM | HEIGHT: 69 IN | TEMPERATURE: 97.4 F

## 2022-10-10 DIAGNOSIS — R97.20 ELEVATED PROSTATE, SPECIFIC ANTIGEN [PSA]: ICD-10-CM

## 2022-10-10 LAB
APPEARANCE: CLEAR
BACTERIA UR CULT: NORMAL
BACTERIA: NEGATIVE
BILIRUBIN URINE: NEGATIVE
BLOOD URINE: NEGATIVE
COLOR: YELLOW
GLUCOSE QUALITATIVE U: NEGATIVE
HYALINE CASTS: 0 /LPF
KETONES URINE: NEGATIVE
LEUKOCYTE ESTERASE URINE: ABNORMAL
MICROSCOPIC-UA: NORMAL
NITRITE URINE: NEGATIVE
PH URINE: 6
PROTEIN URINE: ABNORMAL
PSA FREE FLD-MCNC: 6 %
PSA FREE SERPL-MCNC: 0.03 NG/ML
PSA SERPL-MCNC: 0.44 NG/ML
RED BLOOD CELLS URINE: 2 /HPF
SPECIFIC GRAVITY URINE: 1.03
SQUAMOUS EPITHELIAL CELLS: 3 /HPF
UROBILINOGEN URINE: NORMAL
WHITE BLOOD CELLS URINE: 44 /HPF

## 2022-10-10 PROCEDURE — 99214 OFFICE O/P EST MOD 30 MIN: CPT

## 2022-10-10 NOTE — ASSESSMENT
[FreeTextEntry1] : Doing better\par PSA stable\par Follow up 6 months\par \par Refer to Dr. Diaz/BRENDA Davalos if he worsens

## 2022-10-10 NOTE — HISTORY OF PRESENT ILLNESS
[FreeTextEntry1] : see notes from last visit\par symptoms are resolving\par \par \par PVR today = 0cc\par \par PSA hx for prostate cancer hx\par 0.44 Oct 2022\par 0.46 may 2022\par 0.57 feb 2022\par 1.09 sep 2021\par 1.15 may 2021\par 1.88 jan 2021 post radiation\par 10.70 jul 2020\par 8.43 jan 2020\par 7.95 jul 2019\par 9.46 dec 2018\par 7.86 apr 2017\par 8.2 sep 2016\par \par urine culture negative\par UA: 44 RBCs\par

## 2023-02-01 ENCOUNTER — APPOINTMENT (OUTPATIENT)
Dept: UROLOGY | Facility: CLINIC | Age: 73
End: 2023-02-01

## 2023-02-06 NOTE — ADDENDUM
[FreeTextEntry1] :  I, Pham Templeton, acted solely as a scribe for Dr. Joaquín Steve. The documentation recorded by the scribe accurately reflects the service I personally performed and the decision by me.\par   Price (Do Not Change): 0.00 Instructions: This plan will send the code FBSE to the PM system.  DO NOT or CHANGE the price. Detail Level: Simple

## 2023-02-10 ENCOUNTER — APPOINTMENT (OUTPATIENT)
Dept: GASTROENTEROLOGY | Facility: CLINIC | Age: 73
End: 2023-02-10

## 2023-05-08 ENCOUNTER — APPOINTMENT (OUTPATIENT)
Dept: UROLOGY | Facility: CLINIC | Age: 73
End: 2023-05-08
Payer: MEDICARE

## 2023-05-08 VITALS
HEART RATE: 60 BPM | BODY MASS INDEX: 27.85 KG/M2 | DIASTOLIC BLOOD PRESSURE: 74 MMHG | TEMPERATURE: 98.4 F | HEIGHT: 69 IN | SYSTOLIC BLOOD PRESSURE: 109 MMHG | WEIGHT: 188 LBS | RESPIRATION RATE: 17 BRPM

## 2023-05-08 LAB
APPEARANCE: CLEAR
BACTERIA: NEGATIVE /HPF
BILIRUBIN URINE: NEGATIVE
BLOOD URINE: NEGATIVE
CAST: 3 /LPF
COLOR: YELLOW
EPITHELIAL CELLS: 3 /HPF
GLUCOSE QUALITATIVE U: NEGATIVE MG/DL
KETONES URINE: NEGATIVE MG/DL
LEUKOCYTE ESTERASE URINE: ABNORMAL
MICROSCOPIC-UA: NORMAL
NITRITE URINE: NEGATIVE
PH URINE: 6
PROTEIN URINE: NORMAL MG/DL
PSA SERPL-MCNC: 0.3 NG/ML
RED BLOOD CELLS URINE: 2 /HPF
SPECIFIC GRAVITY URINE: 1.02
UROBILINOGEN URINE: 1 MG/DL
WHITE BLOOD CELLS URINE: 4 /HPF

## 2023-05-08 PROCEDURE — 99214 OFFICE O/P EST MOD 30 MIN: CPT

## 2023-05-08 NOTE — ASSESSMENT
[FreeTextEntry1] : Doing better\par PSA stable\par Follow 12 months\par \par Gross hematuria\par CT Urogram ordered\par 1-2 weeks to review results\par Schedule also 12 months follow up

## 2023-05-08 NOTE — HISTORY OF PRESENT ILLNESS
[FreeTextEntry1] : 72y/o man\par Prostate cancer, BPH, pelvic pain\par symptoms stable\par but new c/o two episodes of painless gross hematuria\par UA- 3RBC's\par \par \par Uroflow\par Uroflow 15.9 cc/sec\par Voided vol 46 cc\par PVR today = cc\par \par PSA hx for prostate cancer hx\par 0.3 May 2023\par 0.44 Oct 2022\par 0.46 may 2022\par 0.57 feb 2022\par 1.09 sep 2021\par 1.15 may 2021\par 1.88 jan 2021 post radiation\par 10.70 jul 2020\par 8.43 jan 2020\par 7.95 jul 2019\par 9.46 dec 2018\par 7.86 apr 2017\par 8.2 sep 2016\par \par urine culture negative\par UA:  3 RBCs\par

## 2023-05-15 ENCOUNTER — OUTPATIENT (OUTPATIENT)
Dept: OUTPATIENT SERVICES | Facility: HOSPITAL | Age: 73
LOS: 1 days | End: 2023-05-15
Payer: MEDICARE

## 2023-05-15 ENCOUNTER — APPOINTMENT (OUTPATIENT)
Dept: CT IMAGING | Facility: IMAGING CENTER | Age: 73
End: 2023-05-15
Payer: MEDICARE

## 2023-05-15 DIAGNOSIS — Z98.890 OTHER SPECIFIED POSTPROCEDURAL STATES: Chronic | ICD-10-CM

## 2023-05-15 DIAGNOSIS — Z90.79 ACQUIRED ABSENCE OF OTHER GENITAL ORGAN(S): Chronic | ICD-10-CM

## 2023-05-15 DIAGNOSIS — R31.0 GROSS HEMATURIA: ICD-10-CM

## 2023-05-15 DIAGNOSIS — C61 MALIGNANT NEOPLASM OF PROSTATE: ICD-10-CM

## 2023-05-15 PROCEDURE — 74178 CT ABD&PLV WO CNTR FLWD CNTR: CPT

## 2023-05-15 PROCEDURE — 74178 CT ABD&PLV WO CNTR FLWD CNTR: CPT | Mod: 26,MH

## 2023-05-22 ENCOUNTER — APPOINTMENT (OUTPATIENT)
Dept: UROLOGY | Facility: CLINIC | Age: 73
End: 2023-05-22
Payer: MEDICARE

## 2023-05-22 ENCOUNTER — APPOINTMENT (OUTPATIENT)
Dept: UROLOGY | Facility: CLINIC | Age: 73
End: 2023-05-22

## 2023-05-22 VITALS
BODY MASS INDEX: 27.85 KG/M2 | TEMPERATURE: 98 F | DIASTOLIC BLOOD PRESSURE: 72 MMHG | SYSTOLIC BLOOD PRESSURE: 112 MMHG | HEART RATE: 58 BPM | WEIGHT: 188 LBS | RESPIRATION RATE: 17 BRPM | HEIGHT: 69 IN

## 2023-05-22 DIAGNOSIS — N21.0 CALCULUS IN BLADDER: ICD-10-CM

## 2023-05-22 PROCEDURE — 99214 OFFICE O/P EST MOD 30 MIN: CPT

## 2023-05-22 NOTE — HISTORY OF PRESENT ILLNESS
[FreeTextEntry1] : 72y/o man\par Prostate cancer, BPH, pelvic pain\par symptoms stable\par but new c/o two episodes of painless gross hematuria\par UA- 3RBC's\par \par PSA hx for prostate cancer hx\par 0.3 May 2023\par 0.44 Oct 2022\par 0.46 may 2022\par 0.57 feb 2022\par 1.09 sep 2021\par 1.15 may 2021\par 1.88 jan 2021 post radiation\par 10.70 jul 2020\par 8.43 jan 2020\par 7.95 jul 2019\par 9.46 dec 2018\par 7.86 apr 2017\par 8.2 sep 2016\par \par urine culture negative\par UA: 3 RBCs\par Presents today to review CT Urogram \par

## 2023-05-22 NOTE — ASSESSMENT
[FreeTextEntry1] : CT Urogram- kidney- no stones, hydronephrosis or solid masses. Bladder - tiny calcifications within small diverticulum. Prostatic calcifications. \par \par Doing better\par PSA stable\par Follow 12 months

## 2023-11-20 ENCOUNTER — APPOINTMENT (OUTPATIENT)
Dept: UROLOGY | Facility: CLINIC | Age: 73
End: 2023-11-20

## 2024-05-20 ENCOUNTER — APPOINTMENT (OUTPATIENT)
Dept: UROLOGY | Facility: CLINIC | Age: 74
End: 2024-05-20

## 2024-05-24 LAB
PSA FREE FLD-MCNC: 10 %
PSA FREE SERPL-MCNC: 0.02 NG/ML
PSA SERPL-MCNC: 0.23 NG/ML

## 2024-05-31 ENCOUNTER — APPOINTMENT (OUTPATIENT)
Dept: UROLOGY | Facility: CLINIC | Age: 74
End: 2024-05-31
Payer: MEDICARE

## 2024-05-31 DIAGNOSIS — N13.8 BENIGN PROSTATIC HYPERPLASIA WITH LOWER URINARY TRACT SYMPMS: ICD-10-CM

## 2024-05-31 DIAGNOSIS — N50.819 TESTICULAR PAIN, UNSPECIFIED: ICD-10-CM

## 2024-05-31 DIAGNOSIS — C61 MALIGNANT NEOPLASM OF PROSTATE: ICD-10-CM

## 2024-05-31 DIAGNOSIS — N41.9 INFLAMMATORY DISEASE OF PROSTATE, UNSPECIFIED: ICD-10-CM

## 2024-05-31 DIAGNOSIS — N32.3 DIVERTICULUM OF BLADDER: ICD-10-CM

## 2024-05-31 DIAGNOSIS — N45.2 ORCHITIS: ICD-10-CM

## 2024-05-31 DIAGNOSIS — R39.89 OTHER SYMPTOMS AND SIGNS INVOLVING THE GENITOURINARY SYSTEM: ICD-10-CM

## 2024-05-31 DIAGNOSIS — R31.0 GROSS HEMATURIA: ICD-10-CM

## 2024-05-31 DIAGNOSIS — R97.20 ELEVATED PROSTATE, SPECIFIC ANTIGEN [PSA]: ICD-10-CM

## 2024-05-31 DIAGNOSIS — R30.0 DYSURIA: ICD-10-CM

## 2024-05-31 DIAGNOSIS — N40.1 BENIGN PROSTATIC HYPERPLASIA WITH LOWER URINARY TRACT SYMPMS: ICD-10-CM

## 2024-05-31 PROCEDURE — 99215 OFFICE O/P EST HI 40 MIN: CPT

## 2024-06-02 PROBLEM — R30.0 DYSURIA: Status: ACTIVE | Noted: 2019-04-15

## 2024-06-02 PROBLEM — R31.0 GROSS HEMATURIA: Status: ACTIVE | Noted: 2021-09-21

## 2024-06-02 PROBLEM — N32.3 BLADDER DIVERTICULUM: Status: ACTIVE | Noted: 2021-10-04

## 2024-06-02 PROBLEM — N41.9 PROSTATITIS: Status: ACTIVE | Noted: 2020-07-21

## 2024-06-02 PROBLEM — C61 PROSTATE CANCER: Status: ACTIVE | Noted: 2021-01-26

## 2024-06-02 PROBLEM — N45.2 ORCHITIS: Status: ACTIVE | Noted: 2020-06-17

## 2024-06-02 PROBLEM — N50.819 TESTICULAR PAIN: Status: ACTIVE | Noted: 2020-06-17

## 2024-06-02 PROBLEM — R39.89 BLADDER PAIN: Status: ACTIVE | Noted: 2022-01-14

## 2024-06-02 NOTE — ASSESSMENT
[FreeTextEntry1] : 2/5/19: The patient returned today for an MRI/ultrasound fusion guided biopsy of the prostate. Was administered an Amikacin injection. Took Valium and Diazepam as directed. Last biopsy was years ago. Notes his urination is adequate. Has been taking Tamsulosin once daily. Inquired about an operation on the prostate. I advised the patient about his options of a laser enucleation or trying other medication. I discouraged the patient from considering a green light laser as the patient's prostate is larger than most that would undergo this procedure. We discussed the risks and benefits of both, as the patient will decide which route he wants to take in the future. Retired. Biopsy: I parted the butt cheeks. Placed the probe in the rectum. Lidocaine jelly used. Measured the prostate volume. Swept from bladder down to the perineum. During the biopsy, there were technical difficulties. After some time, the technical difficulties were fixed and we continued with the procedure. We took four biopsies from the targeted lesion as well as other sampling biopsies. We will send the biopsies to pathology. I advised the patient to drink lots of water and that he should expect blood from his urine and stool, but it will resolve. The patient will return in one week for a follow up with Dr. Steve to discuss prostate biopsy results.  06/17/2020: Patient presents today for a follow up. Pt has moderate pain, but did not think it was serious enough to take pain killers. Has had three Bxs, three MRIs and a TURP in the past. TURP showed prostate tissue was benign. PSA on 09/12/18 was 9.46. On 01/28/2020, the PSA was 8.43. Last prostate Bx on 02/05/19. US today shows and infection of the testicles. US renal today shows epididymitis of the right epididymis, heterogenous in appearance and simple cyst in the head measuring 0.63 cm x 0.58 cm, nonvascular. Hydrocele on the right testes measuring 3.44 x 1.44 x 2.1 cm. On the left epididymis there is a septated cyst measuring 0.65 x 0.60 cm with no vascularity. Bilateral varicocele with vsm measuring 0.31 cm on the right and 0.29 cm on the left. Right side scrotal skin is slightly swollen and red. Pt can touch scrotum without undo pain. Left testicle, epididymis and spermatic cord is non tender, no masses. Hydrocele on right testes which is tender. Right epididymis is more tender. Spermatic cord is also tender. Prescribed Sulfamethoxazole- Trimethoprim BID with food for 2 weeks. Advised pt to avoid exercise and sex while on the pill. Pt will provide a urine sample today for culture, cytology and analysis. Scheduled repeat US testis. RTO in 2 weeks for US renal  07/01/2020: Patient presents today for a follow up. PSA was 8.43 on 01/28/2020. US renal today shows improvement on the right side, but still some vascularity. No stones, tumors or cysts. Pain has gone away with Sulfamethoxazole- Trimethoprim. Reports testicle are tender and of uneven size. Pt had a prostate Bx scheduled, but it has been delayed. MRI prostate on 03/09/2020 showed prostate volume of 22cc and a suspicious lesion in the left anterior peripheral zone with a PIRADS score of 4. Pt reports no artificial parts, doesn't take aspirin, or blood thinners/inhibitor. No medical allergies Pt will provide a urine sample today for culture, cytology and analysis. Bloodwork today will include ALK PHOS, BMP PSA and testosterone. Pt will have transperineal fusion prostate Bx with local anesthesia. Risks and benefits explained. Alternative of having biopsy under anesthesia offered and explained. Alternative of observation without biopsy at this time discussed. If pt is found to have low grade cancer, it will be watched. If it is aggressive, he will have surgical removal of the prostate or radiation. He will take Valium 10 mg 1 hour before the procedure. RTO in 1-4 weeks.  07/28/2020: Patient presents today for a follow up. On 07/01/2020, PSA was 10.70, Creatinine was 1.28, ALK PHOS was 64, and Testosterone was 399. Prostate Bx from 07/20/2020 with one target reveals Ana 4+3=7. No urinary symptoms.   01/26/2021: The pt presents today for a follow up. He has undergone radiation therapy at [facility]. 5 day maximum dosage, TrueBeam radiation. On the 5th day he had some burning and his urination was like it had been before the TURP but those subsided. The pt feels great overall. He has some nocturia 1x but nothing bothersome. Denies any urinary urgency, hematuria, pushing, straining, diarrhea, constipation, chest pains. He has not undergone any hormone treatment after getting multiple opinions. His last PSA from 1/20/2021 was 1.88. He is done seeing the radiation oncologist for now, . He reports he recently had a bone scan ordered by  that he will have the results sent over. He reports that the bone scan was good. He does not got erections anymore but he does not have any interest right now.   05/24/2021: Patient presents today for follow up. Patient states that in October, he started to have burning on urination. He called his oncologist who told him to take Alleve which improved his symptoms, but symptoms returned a week later. Patient presents today to discuss these symptoms. Urination is satisfactory currently. Wakes 0-1x at night to urinate. Denies dysuria, pushing or straining, gross hematuria. Urinates 4-5x during the day. Notes that while he had burning urination, did drip at the end of urination. Notes that after a TURP 4/2018, erections became poor, but not interested in medication. PSA 5/10/21 was 1.15, which is decreased from 10.70 on 7/1/20 s/p TrueBeam radiation. States he had a bone scan on October at [facility] reportedly showing no osseous metastasis.   The patient produced a urine sample which will be sent for urinalysis, urine cytology, and urine culture.  Blood work today included BMP, alkaline phosphatase, PSA, and testosterone.  We suggested a one month visit, but patient declines and prefers to come in 3 months, but agrees to call if he new symptoms develop and needs to come to the office.   05/31/2024: Patient last seen in 2021 and is being followed by Dr. Steve and Dr. Valles. Mr. MENSAH presents today for a follow up audio-visual telehealth visit for which they gave permission for. We utilized [employer] platform. The patient was located at home [address] [place] [address] and I was located in my office in [place], [place]. The 5/24/24 PSA was 0.23. PSA 5/10/21 when he was last seen by me was1.15, which is decreased from 10.70 on 7/1/20 s/p TrueBeam radiation. Receives radiation at [facility], with .  Informs me he had to do the TURP twice, but both time resulted in no changes in his diverticula. His diverticulectomy was 3-4 years ago. Currently urinary control is perfect as per patient. Admits to gross hematuria onset 2 months ago and was told it is the stone passing. Nocturia x 2.   We will increase Desmopressin due to Nocturia.   Pt will go to his nearest [facility] lab for blood work including BMP, alkaline phosphatase, PSA, and testosterone and a urine sample which will be sent for urinalysis, urine culture, and urine cytology due to onset gross hematuria.   Pt will schedule a telehealth visit in 3 weeks to review labs. The patient will return to office in October.   Preparation, audio-visual visit, and coordination of care took 45 Minutes. acetaminophen 325 mg oral tablet: 2 tab(s) orally every 6 hours, As needed, Mild Pain (1 - 3)  apixaban 5 mg oral tablet: 1 tab(s) orally every 12 hours  aspirin 81 mg oral tablet: 1  orally once a day  cilostazol 100 mg oral tablet: 1 tab(s) orally 2 times a day  clotrimazole 1% topical cream: 1 application topically 2 times a day  escitalopram 5 mg oral tablet: 1 tab(s) orally once a day  ferrous sulfate 325 mg (65 mg elemental iron) oral tablet: 1 tab(s) orally once a day  folic acid 1 mg oral tablet: 1 tab(s) orally once a day  hydrocortisone 1% topical cream: 1 application topically once a day  metoprolol succinate 100 mg oral tablet, extended release: 1 tab(s) orally once a day  nystatin 100,000 units/g topical cream: 1 application topically 2 times a day  spironolactone 25 mg oral tablet: 1 tab(s) orally once a day  Synthroid 137 mcg (0.137 mg) oral tablet: 1 tab(s) orally once a day  torsemide 20 mg oral tablet: 1 tab(s) orally once a day  Vitamin D3 2000 intl units (50 mcg) oral tablet: 1  orally once a day  Vytorin 10 mg-40 mg oral tablet: 1 tab(s) orally once a day   acetaminophen 325 mg oral tablet: 2 tab(s) orally every 6 hours, As needed, Mild Pain (1 - 3)  apixaban 5 mg oral tablet: 1 tab(s) orally every 12 hours  aspirin 81 mg oral tablet: 1  orally once a day  cilostazol 100 mg oral tablet: 1 tab(s) orally 2 times a day  clotrimazole 1% topical cream: 1 application topically 2 times a day  escitalopram 5 mg oral tablet: 1 tab(s) orally once a day  ferrous sulfate 325 mg (65 mg elemental iron) oral tablet: 1 tab(s) orally once a day  folic acid 1 mg oral tablet: 1 tab(s) orally once a day  hydrocortisone 1% topical cream: 1 application topically once a day  metoprolol succinate 100 mg oral tablet, extended release: 1 tab(s) orally once a day  midodrine 10 mg oral tablet: 1 tab(s) orally 3 times a day  nystatin 100,000 units/g topical cream: 1 application topically 2 times a day  spironolactone 25 mg oral tablet: 1 tab(s) orally once a day  Synthroid 137 mcg (0.137 mg) oral tablet: 1 tab(s) orally once a day  torsemide 20 mg oral tablet: 1 tab(s) orally once a day  Vitamin D3 2000 intl units (50 mcg) oral tablet: 1  orally once a day  Vytorin 10 mg-40 mg oral tablet: 1 tab(s) orally once a day   apixaban 5 mg oral tablet: 1 tab(s) orally every 12 hours  aspirin 81 mg oral tablet: 1  orally once a day  clotrimazole 1% topical cream: 1 application topically 2 times a day  escitalopram 5 mg oral tablet: 1 tab(s) orally once a day  ferrous sulfate 325 mg (65 mg elemental iron) oral tablet: 1 tab(s) orally once a day  folic acid 1 mg oral tablet: 1 tab(s) orally once a day  hydrocortisone 1% topical cream: 1 application topically once a day  metoprolol succinate 100 mg oral tablet, extended release: 1 tab(s) orally once a day  midodrine 10 mg oral tablet: 1 tab(s) orally 3 times a day  nystatin 100,000 units/g topical cream: 1 application topically 2 times a day  Synthroid 137 mcg (0.137 mg) oral tablet: 1 tab(s) orally once a day  vancomycin 500 mg/100 mL intravenous solution: 500 milligram(s) intravenously Monday, Wednesday, and Friday TO BE GIVEN ON POST HD DAYS UNTIL 5/12  Vitamin D3 2000 intl units (50 mcg) oral tablet: 1  orally once a day  Vytorin 10 mg-40 mg oral tablet: 1 tab(s) orally once a day

## 2024-06-02 NOTE — HISTORY OF PRESENT ILLNESS
[FreeTextEntry1] : 68 year old male presents for evaluation of test results. This pt has been on Flomax for many years .  Nocturia x 1-2  Stream is weak  Occasional intermittency  He had Bx in the past, none of which were targeted. Uroflow 12/13/18: Voided volume of 55ml and PVR of 140ml. Peak flow of 7.1 Repeat PSA marked increase in 2 years. PSA is now 9.46, last result from 12/13/18.  MRI on 01/04/19 reported prostate volume of 95.7 cc and PIRADS-3. We will schedule pt for targeted prostate Bx.  We will do a residual on him after his prostate Bx due to previous residual of 140 mL.   2/5/19: The patient returned today for an MRI/ultrasound fusion guided biopsy of the prostate. Was administered an Amikacin injection. Took Valium and Diazepam as directed. Last biopsy was years ago. Notes his urination is adequate. Has been taking Tamsulosin once daily. Inquired about an operation on the prostate. I advised the patient about his options of a laser enucleation or trying other medication. I discouraged the patient from considering a green light laser as the patient's prostate is larger than most that would undergo this procedure. We discussed the risks and benefits of both, as the patient will decide which route he wants to take in the future. Retired. 06/17/2020: Patient presents today for a follow up. Pt has moderate pain, but did not think it was serious enough to take pain killers. Has had three Bxs, three MRIs and a TURP in the past. TURP showed prostate tissue was benign. PSA on 09/12/18 was 9.46. On 01/28/2020, the PSA was 8.43. Last prostate Bx on 02/05/19. US today shows and infection of the testicles. US renal today shows epididymitis of the right epididymis, heterogenous in appearance and simple cyst in the head measuring 0.63 cm x 0.58 cm, nonvascular. Hydrocele on the right testes measuring 3.44 x 1.44 x 2.1 cm. On the left epididymis there is a septated cyst measuring 0.65 x 0.60 cm with no vascularity. Bilateral varicocele with vsm measuring 0.31 cm on the right and 0.29 cm on the left.   07/01/2020: Patient presents today for a follow up. PSA was 8.43 on 01/28/2020. US renal today shows improvement on the right side, but still some vascularity. No stones, tumors or cysts. Pain has gone away with Sulfamethoxazole- Trimethoprim. Reports testicle are tender and of uneven size. Pt had a prostate Bx scheduled, but it has been delayed. MRI prostate on 03/09/2020 showed prostate volume of 22cc and a suspicious lesion in the left anterior peripheral zone with a PIRADS score of 4. Pt reports no artificial parts, doesn't take aspirin, or blood thinners/inhibitor. No medical allergies   01/26/2021: The pt presents today for a follow up. He has undergone radiation therapy at [facility]. 5 day maximum dosage, TrueBeam radiation. On the 5th day he had some burning and his urination was like it had been before the TURP but those subsided. The pt feels great overall. He has some nocturia 1x but nothing bothersome. Denies any urinary urgency, hematuria, pushing, straining, diarrhea, constipation, chest pains. He has not undergone any hormone treatment after getting multiple opinions. His last PSA from 1/20/2021 was 1.88. He is done seeing the radiation oncologist for now, . He reports he recently had a bone scan  ordered by  that he will have the results sent over. He reports that the bone scan was good. He does not got erections anymore but he does not have any interest right now.   05/24/2021: Patient presents today for follow up. Patient states that in October, he started to have burning on urination. He called his oncologist who told him to take Alleve which improved his symptoms, but symptoms returned a week later. Patient presents today to discuss these symptoms. Urination is satisfactory currently. Wakes 0-1x at night to urinate. Denies dysuria, pushing or straining, gross hematuria. Urinates 4-5x during the day. Notes that while he had burning urination, did drip at the end of urination. Notes that after a TURP 4/2018, erections became poor, but not interested in medication. PSA 5/10/21 was 1.15, which is decreased from 10.70 on 7/1/20 s/p TrueBeam radiation. States he had a bone scan on October at [facility] reportedly showing no osseous metastasis.   05/31/2024: Patient last seen in 2021 and is being followed by Dr. Steve and Dr. Valles. Mr. MENSAH presents today for a follow up audio-visual telehealth visit for which they gave permission for. We utilized [employer] platform. The patient was located at home [address] [place] [address] and I was located in my office in [place], [place]. The 5/24/24 PSA was 0.23. PSA 5/10/21 when he was last seen by me was1.15, which is decreased from 10.70 on 7/1/20 s/p TrueBeam radiation. Receives radiation at [facility], with .  Informs me he had to do the TURP twice, but both time resulted in no changes in his diverticula. His diverticulectomy was 3-4 years ago. Currently urinary control is perfect as per patient. Admits to gross hematuria onset 2 months ago and was told it is the stone passing. Nocturia x 2.

## 2024-06-02 NOTE — ADDENDUM
[FreeTextEntry1] : This note was authored by Kiley Crockett working as a scribe for Dr. Tiburcio Decker. I, Dr. Tiburcio Decker have reviewed the content of this note and confirm it is true and accurate. I personally performed the history and physical examination and made all the decisions 05/31/2024.

## 2024-07-08 ENCOUNTER — APPOINTMENT (OUTPATIENT)
Dept: UROLOGY | Facility: CLINIC | Age: 74
End: 2024-07-08
Payer: MEDICARE

## 2024-07-08 DIAGNOSIS — N41.9 INFLAMMATORY DISEASE OF PROSTATE, UNSPECIFIED: ICD-10-CM

## 2024-07-08 DIAGNOSIS — R82.89 OTHER ABNRM FNDNGS ON CYTOLOGICAL: ICD-10-CM

## 2024-07-08 DIAGNOSIS — N13.8 BENIGN PROSTATIC HYPERPLASIA WITH LOWER URINARY TRACT SYMPMS: ICD-10-CM

## 2024-07-08 DIAGNOSIS — C61 MALIGNANT NEOPLASM OF PROSTATE: ICD-10-CM

## 2024-07-08 DIAGNOSIS — N40.1 BENIGN PROSTATIC HYPERPLASIA WITH LOWER URINARY TRACT SYMPMS: ICD-10-CM

## 2024-07-08 DIAGNOSIS — N48.89 OTHER SPECIFIED DISORDERS OF PENIS: ICD-10-CM

## 2024-07-08 DIAGNOSIS — R39.89 OTHER SYMPTOMS AND SIGNS INVOLVING THE GENITOURINARY SYSTEM: ICD-10-CM

## 2024-07-08 DIAGNOSIS — R30.0 DYSURIA: ICD-10-CM

## 2024-07-08 LAB
ALP BLD-CCNC: 66 U/L
ANION GAP SERPL CALC-SCNC: 12 MMOL/L
APPEARANCE: CLEAR
BACTERIA UR CULT: NORMAL
BACTERIA: NEGATIVE /HPF
BILIRUBIN URINE: NEGATIVE
BLOOD URINE: NEGATIVE
BUN SERPL-MCNC: 22 MG/DL
CALCIUM SERPL-MCNC: 9.2 MG/DL
CAST: 1 /LPF
CHLORIDE SERPL-SCNC: 107 MMOL/L
CO2 SERPL-SCNC: 25 MMOL/L
COLOR: YELLOW
CREAT SERPL-MCNC: 0.92 MG/DL
EGFR: 87 ML/MIN/1.73M2
EPITHELIAL CELLS: 2 /HPF
GLUCOSE QUALITATIVE U: NEGATIVE MG/DL
GLUCOSE SERPL-MCNC: 103 MG/DL
KETONES URINE: NEGATIVE MG/DL
LEUKOCYTE ESTERASE URINE: ABNORMAL
MICROSCOPIC-UA: NORMAL
NITRITE URINE: NEGATIVE
PH URINE: 6
POTASSIUM SERPL-SCNC: 4.3 MMOL/L
PROTEIN URINE: 30 MG/DL
RED BLOOD CELLS URINE: 0 /HPF
SODIUM SERPL-SCNC: 144 MMOL/L
SPECIFIC GRAVITY URINE: 1.02
TESTOST FREE SERPL-MCNC: 2.2 PG/ML
TESTOST SERPL-MCNC: 402 NG/DL
URINE CYTOLOGY: NORMAL
UROBILINOGEN URINE: 1 MG/DL
WHITE BLOOD CELLS URINE: 9 /HPF

## 2024-07-08 PROCEDURE — 99214 OFFICE O/P EST MOD 30 MIN: CPT

## 2024-07-14 PROBLEM — N48.89 PENIS PAIN: Status: ACTIVE | Noted: 2024-07-14

## 2024-09-15 ENCOUNTER — NON-APPOINTMENT (OUTPATIENT)
Age: 74
End: 2024-09-15

## 2024-09-16 ENCOUNTER — APPOINTMENT (OUTPATIENT)
Dept: UROLOGY | Facility: CLINIC | Age: 74
End: 2024-09-16
Payer: MEDICARE

## 2024-09-16 DIAGNOSIS — N50.819 TESTICULAR PAIN, UNSPECIFIED: ICD-10-CM

## 2024-09-16 DIAGNOSIS — R82.89 OTHER ABNRM FNDNGS ON CYTOLOGICAL: ICD-10-CM

## 2024-09-16 DIAGNOSIS — N45.2 ORCHITIS: ICD-10-CM

## 2024-09-16 DIAGNOSIS — N41.9 INFLAMMATORY DISEASE OF PROSTATE, UNSPECIFIED: ICD-10-CM

## 2024-09-16 DIAGNOSIS — C61 MALIGNANT NEOPLASM OF PROSTATE: ICD-10-CM

## 2024-09-16 DIAGNOSIS — R97.20 ELEVATED PROSTATE, SPECIFIC ANTIGEN [PSA]: ICD-10-CM

## 2024-09-16 DIAGNOSIS — N48.89 OTHER SPECIFIED DISORDERS OF PENIS: ICD-10-CM

## 2024-09-16 DIAGNOSIS — N40.1 BENIGN PROSTATIC HYPERPLASIA WITH LOWER URINARY TRACT SYMPMS: ICD-10-CM

## 2024-09-16 DIAGNOSIS — R39.89 OTHER SYMPTOMS AND SIGNS INVOLVING THE GENITOURINARY SYSTEM: ICD-10-CM

## 2024-09-16 DIAGNOSIS — R30.0 DYSURIA: ICD-10-CM

## 2024-09-16 DIAGNOSIS — N32.3 DIVERTICULUM OF BLADDER: ICD-10-CM

## 2024-09-16 DIAGNOSIS — N13.8 BENIGN PROSTATIC HYPERPLASIA WITH LOWER URINARY TRACT SYMPMS: ICD-10-CM

## 2024-09-16 PROCEDURE — 99214 OFFICE O/P EST MOD 30 MIN: CPT

## 2024-09-16 NOTE — PHYSICAL EXAM
[Normal Appearance] : normal appearance [General Appearance - In No Acute Distress] : no acute distress [] : no respiratory distress [Skin Color & Pigmentation] : normal skin color and pigmentation [Oriented To Time, Place, And Person] : oriented to person, place, and time [Affect] : the affect was normal [Mood] : the mood was normal

## 2024-09-19 NOTE — ASSESSMENT
[FreeTextEntry1] : 2/5/19: The patient returned today for an MRI/ultrasound fusion guided biopsy of the prostate. Was administered an Amikacin injection. Took Valium and Diazepam as directed. Last biopsy was years ago. Notes his urination is adequate. Has been taking Tamsulosin once daily. Inquired about an operation on the prostate. I advised the patient about his options of a laser enucleation or trying other medication. I discouraged the patient from considering a green light laser as the patient's prostate is larger than most that would undergo this procedure. We discussed the risks and benefits of both, as the patient will decide which route he wants to take in the future. Retired. Biopsy: I parted the butt cheeks. Placed the probe in the rectum. Lidocaine jelly used. Measured the prostate volume. Swept from bladder down to the perineum. During the biopsy, there were technical difficulties. After some time, the technical difficulties were fixed and we continued with the procedure. We took four biopsies from the targeted lesion as well as other sampling biopsies. We will send the biopsies to pathology. I advised the patient to drink lots of water and that he should expect blood from his urine and stool, but it will resolve. The patient will return in one week for a follow up with Dr. Steve to discuss prostate biopsy results.  06/17/2020: Patient presents today for a follow up. Pt has moderate pain, but did not think it was serious enough to take pain killers. Has had three Bxs, three MRIs and a TURP in the past. TURP showed prostate tissue was benign. PSA on 09/12/18 was 9.46. On 01/28/2020, the PSA was 8.43. Last prostate Bx on 02/05/19. US today shows and infection of the testicles. US renal today shows epididymitis of the right epididymis, heterogenous in appearance and simple cyst in the head measuring 0.63 cm x 0.58 cm, nonvascular. Hydrocele on the right testes measuring 3.44 x 1.44 x 2.1 cm. On the left epididymis there is a septated cyst measuring 0.65 x 0.60 cm with no vascularity. Bilateral varicocele with vsm measuring 0.31 cm on the right and 0.29 cm on the left. Right side scrotal skin is slightly swollen and red. Pt can touch scrotum without undo pain. Left testicle, epididymis and spermatic cord is non tender, no masses. Hydrocele on right testes which is tender. Right epididymis is more tender. Spermatic cord is also tender. Prescribed Sulfamethoxazole- Trimethoprim BID with food for 2 weeks. Advised pt to avoid exercise and sex while on the pill. Pt will provide a urine sample today for culture, cytology and analysis. Scheduled repeat US testis. RTO in 2 weeks for US renal  07/01/2020: Patient presents today for a follow up. PSA was 8.43 on 01/28/2020. US renal today shows improvement on the right side, but still some vascularity. No stones, tumors or cysts. Pain has gone away with Sulfamethoxazole- Trimethoprim. Reports testicle are tender and of uneven size. Pt had a prostate Bx scheduled, but it has been delayed. MRI prostate on 03/09/2020 showed prostate volume of 22cc and a suspicious lesion in the left anterior peripheral zone with a PIRADS score of 4. Pt reports no artificial parts, doesn't take aspirin, or blood thinners/inhibitor. No medical allergies Pt will provide a urine sample today for culture, cytology and analysis. Bloodwork today will include ALK PHOS, BMP PSA and testosterone. Pt will have transperineal fusion prostate Bx with local anesthesia. Risks and benefits explained. Alternative of having biopsy under anesthesia offered and explained. Alternative of observation without biopsy at this time discussed. If pt is found to have low grade cancer, it will be watched. If it is aggressive, he will have surgical removal of the prostate or radiation. He will take Valium 10 mg 1 hour before the procedure. RTO in 1-4 weeks.  07/28/2020: Patient presents today for a follow up. On 07/01/2020, PSA was 10.70, Creatinine was 1.28, ALK PHOS was 64, and Testosterone was 399. Prostate Bx from 07/20/2020 with one target reveals Ana 4+3=7. No urinary symptoms.   01/26/2021: The pt presents today for a follow up. He has undergone radiation therapy at [facility]. 5 day maximum dosage, TrueBeam radiation. On the 5th day he had some burning and his urination was like it had been before the TURP but those subsided. The pt feels great overall. He has some nocturia 1x but nothing bothersome. Denies any urinary urgency, hematuria, pushing, straining, diarrhea, constipation, chest pains. He has not undergone any hormone treatment after getting multiple opinions. His last PSA from 1/20/2021 was 1.88. He is done seeing the radiation oncologist for now, . He reports he recently had a bone scan ordered by  that he will have the results sent over. He reports that the bone scan was good. He does not got erections anymore but he does not have any interest right now.   05/24/2021: Patient presents today for follow up. Patient states that in October, he started to have burning on urination. He called his oncologist who told him to take Alleve which improved his symptoms, but symptoms returned a week later. Patient presents today to discuss these symptoms. Urination is satisfactory currently. Wakes 0-1x at night to urinate. Denies dysuria, pushing or straining, gross hematuria. Urinates 4-5x during the day. Notes that while he had burning urination, did drip at the end of urination. Notes that after a TURP 4/2018, erections became poor, but not interested in medication. PSA 5/10/21 was 1.15, which is decreased from 10.70 on 7/1/20 s/p TrueBeam radiation. States [place] [facility] [place]seous metastasis.   The patient produced a urine sample which will be sent for urinalysis, urine cytology, and urine culture.  Blood work today included BMP, alkaline phosphatase, PSA, and testosterone.  We suggested a one month visit, but patient declines and prefers to come in 3 months, but agrees to call if he new symptoms develop and needs to come to the office.   05/31/2024: Patient last seen in 2021 and is being followed by Dr. Steve and Dr. Valles. Mr. MENSAH presents today for a follow up audio-visual telehealth visit for which they gave permission for. We utilized [employer] platform. The patient was located at home [address] [place] [address] and I was located in my office in [place], [place]. The 5/24/24 PSA was 0.23. PSA 5/10/21 when he was last seen by me was1.15, which is decreased from 10.70 on 7/1/20 s/p TrueBeam radiation. Receives radiation at [facility], with .  Informs me he had to do the TURP twice, but both time resulted in no changes in his diverticula. His diverticulectomy was 3-4 years ago. Currently urinary control is perfect as per patient. Admits to gross hematuria onset 2 months ago and was told it is the stone passing. Nocturia x 2.   We will increase Desmopressin due to Nocturia.  Pt will go to his nearest [facility] lab for blood work including BMP, alkaline phosphatase, PSA, and testosterone and a urine sample which will be sent for urinalysis, urine culture, and urine cytology due to onset gross hematuria.  Pt will schedule a telehealth visit in 3 weeks to review labs. The patient will return to office in October.   07/08/2024: Mr. LUCAS MENSAH presents today for an audio visual telehealth visit for which he gave permission for. The patient was located at home at [address] [place] [address] and I was located at my office in [place], [place]. The patient was quite upset as he was called late at 4:20 PM, 2 hours after his appt time. I apologized for this and advised him to ask for a day that I do not see in person patients when he schedules his appts as I tend to run early/on time when I am just doing telehealth. The patient had lab work done on 7/2/2024. UA revealed 30 mg/dl of protein, trace LE, and 9 WBC/HPF. Urine culture was no significant growth. Urine cytology was negative for high grade urothelial carcinoma. Specimen consists of clusters of urothelial cells, in the background of reactive urothelial cells, and mild acute inflammations. The differential diagnosis includes mechanical disruption (instrumentation, lithiasis, vigorous activity, deep palpation) and papillary urothelial lesion (low-grade or benign). Clinical-pathological-radiological correlation is essential. Creatinine was 0.92. Sodium was normal. Alk phos was normal. Total T was normal at 402, however free testosterone was low at 2.2 pg/mL. Patient denies smoking hx. Does have a hx of radiation for prostate cancer. He feels his vitality is quite good. Is fairly active. His memory is good. He is not really sexually active anymore. Of note, patient will be away for the next two weeks. The patient is not taking desmopressin. Nocturia x0-1. Denies any gross hematuria. Pt does report some intermittent pain at the tip of the penis at the end of urination. Denies redness. Feels his liquid is darker than normal.   Reviewed and discussed lab work of 7/2/2024 in detail with the pt. The patient will provide a new urine specimen for repeat cytology. Patient will be mailed his annotated lab results.  Patient advised to increase water intake to dilute any irritants in his urine which might be causing the pain at the tip of the penis.  Patient will have a telehealth visit after providing his next urine specimen to review and discuss results.   09/16/2024: Mr. MENSAH is a 74 year old male presenting today via telehealth using real time 2 way audio-visual technology. We utilized [employer]. The patient, Mr. MENSAH, was located in his home at [address] [place] [address] at the time of the visit. The provider, IRINEO DICKENS, was located in his office on [place], [place]. Verbal Consent was given by the patient on 09/16/2024 and my scribe served as witness. Pt was very frustrated and agitated after being unable to reach me about his  recent Urine cytology on 7/25/24 which he was concerned about. He reports speaking with the office a few times and was told I would reach back to me urgently. My nurse, my  and myself have no records of the patient's calls.  Pt's Cytology on 7/25/24 was Negative for high-grade urothelial carcinoma. It revealed "Reactive urothelial cells, mature squamous cells, mild acute inflammation, and red blood cells present". Pt is uncircumscribed which may account for presence of squamous cells during collection as urine touches the tip of the penis.   Explained to patient that his urine does not show cells that are suspicious for malignancy.   Also explained to patient that our office only call patients for abnormal results.  If patient is not satisfied with our office, I am agreeable to refer patient to another urologist. He wants to stay with us.  In the future, we will send patients' lab results to him via email.    Preparation, audio-visual visit, and coordination of care took: 30 mins

## 2024-09-19 NOTE — HISTORY OF PRESENT ILLNESS
[FreeTextEntry1] : 68 year old male presents for evaluation of test results. This pt has been on Flomax for many years .  Nocturia x 1-2  Stream is weak  Occasional intermittency  He had Bx in the past, none of which were targeted. Uroflow 12/13/18: Voided volume of 55ml and PVR of 140ml. Peak flow of 7.1 Repeat PSA marked increase in 2 years. PSA is now 9.46, last result from 12/13/18.  MRI on 01/04/19 reported prostate volume of 95.7 cc and PIRADS-3. We will schedule pt for targeted prostate Bx.  We will do a residual on him after his prostate Bx due to previous residual of 140 mL.   2/5/19: The patient returned today for an MRI/ultrasound fusion guided biopsy of the prostate. Was administered an Amikacin injection. Took Valium and Diazepam as directed. Last biopsy was years ago. Notes his urination is adequate. Has been taking Tamsulosin once daily. Inquired about an operation on the prostate. I advised the patient about his options of a laser enucleation or trying other medication. I discouraged the patient from considering a green light laser as the patient's prostate is larger than most that would undergo this procedure. We discussed the risks and benefits of both, as the patient will decide which route he wants to take in the future. Retired. 06/17/2020: Patient presents today for a follow up. Pt has moderate pain, but did not think it was serious enough to take pain killers. Has had three Bxs, three MRIs and a TURP in the past. TURP showed prostate tissue was benign. PSA on 09/12/18 was 9.46. On 01/28/2020, the PSA was 8.43. Last prostate Bx on 02/05/19. US today shows and infection of the testicles. US renal today shows epididymitis of the right epididymis, heterogenous in appearance and simple cyst in the head measuring 0.63 cm x 0.58 cm, nonvascular. Hydrocele on the right testes measuring 3.44 x 1.44 x 2.1 cm. On the left epididymis there is a septated cyst measuring 0.65 x 0.60 cm with no vascularity. Bilateral varicocele with vsm measuring 0.31 cm on the right and 0.29 cm on the left.   07/01/2020: Patient presents today for a follow up. PSA was 8.43 on 01/28/2020. US renal today shows improvement on the right side, but still some vascularity. No stones, tumors or cysts. Pain has gone away with Sulfamethoxazole- Trimethoprim. Reports testicle are tender and of uneven size. Pt had a prostate Bx scheduled, but it has been delayed. MRI prostate on 03/09/2020 showed prostate volume of 22cc and a suspicious lesion in the left anterior peripheral zone with a PIRADS score of 4. Pt reports no artificial parts, doesn't take aspirin, or blood thinners/inhibitor. No medical allergies   01/26/2021: The pt presents today for a follow up. He has undergone radiation therapy at [facility]. 5 day maximum dosage, TrueBeam radiation. On the 5th day he had some burning and his urination was like it had been before the TURP but those subsided. The pt feels great overall. He has some nocturia 1x but nothing bothersome. Denies any urinary urgency, hematuria, pushing, straining, diarrhea, constipation, chest pains. He has not undergone any hormone treatment after getting multiple opinions. His last PSA from 1/20/2021 was 1.88. He is done seeing the radiation oncologist for now, . He reports he recently had a bone scan  ordered by  that he will have the results sent over. He reports that the bone scan was good. He does not got erections anymore but he does not have any interest right now.   05/24/2021: Patient presents today for follow up. Patient states that in October, he started to have burning on urination. He called his oncologist who told him to take Alleve which improved his symptoms, but symptoms returned a week later. Patient presents today to discuss these symptoms. Urination is satisfactory currently. Wakes 0-1x at night to urinate. Denies dysuria, pushing or straining, gross hematuria. Urinates 4-5x during the day. Notes that while he had burning urination, did drip at the end of urination. Notes that after a TURP 4/2018, erections became poor, but not interested in medication. PSA 5/10/21 was 1.15, which is decreased from 10.70 on 7/1/20 s/p TrueBeam radiation. States he [place] [facility] [place]seous [place].   05/31/2024: Patient last seen in 2021 and is being followed by Dr. Steve and Dr. Valles. Mr. MENSAH presents today for a follow up audio-visual telehealth visit for which they gave permission for. We utilized [employer] platform. The patient was located at home [address] [place] [address] and I was located in my office in [place], [place]. The 5/24/24 PSA was 0.23. PSA 5/10/21 when he was last seen by me was1.15, which is decreased from 10.70 on 7/1/20 s/p TrueBeam radiation. Receives radiation at [place], with .  Informs me he had to do the TURP twice, but both time resulted in no changes in his diverticula. His diverticulectomy was 3-4 years ago. Currently urinary control is perfect as per patient. Admits to gross hematuria onset 2 months ago and was told it is the stone passing. Nocturia x 2.   07/08/2024: Mr. LUCAS MENSAH presents today for an audio visual telehealth visit for which he gave permission for. The patient was located at home at [address] [place] [address] and I was located at my office in [place], [place]. The patient was quite upset as he was called late at 4:20 PM, 2 hours after his appt time. I apologized for this and advised him to ask for a day that I do not see in person patients when he schedules his appts as I tend to run early/on time when I am just doing telehealth. The patient had lab work done on 7/2/2024. UA revealed 30 mg/dl of protein, trace LE, and 9 WBC/HPF. Urine culture was no significant growth. Urine cytology was negative for high grade urothelial carcinoma. Specimen consists of clusters of urothelial cells, in the background of reactive urothelial cells, and mild acute inflammations. The differential diagnosis includes mechanical disruption (instrumentation, lithiasis, vigorous activity, deep palpation) and papillary urothelial lesion (low-grade or benign). Clinical-pathological-radiological correlation is essential. Creatinine was 0.92. Sodium was normal. Alk phos was normal. Total T was normal at 402, however free testosterone was low at 2.2 pg/mL. Patient denies smoking hx. Does have a hx of radiation for prostate cancer. He feels his vitality is quite good. Is fairly active. His memory is good. He is not really sexually active anymore. Of note, patient will be away for the next two weeks. The patient is not taking desmopressin. Nocturia x0-1. Denies any gross hematuria. Pt does report some intermittent pain at the tip of the penis at the end of urination. Denies redness. Feels his liquid is darker than normal.   09/16/2024: Mr. MENSAH is a 74 year old male presenting today via telehealth using real time 2 way audio-visual technology. We utilized [employer]. The patient, Mr. MENSAH, was located in his home at [address] [place] [address] at the time of the visit. The provider, IRINEO DICKENS, was located in his office on [place], [place]. Verbal Consent was given by the patient on 09/16/2024 and my scribe served as witness. Pt was very frustrated and agitated after being unable to reach me about his  recent Urine cytology on 7/25/24 which he was concerned about. He reports speaking with the office a few times and was told I would reach back to me urgently. My nurse, my  and myself have no records of the patient's calls.  Pt's Cytology on 7/25/24 was Negative for high-grade urothelial carcinoma. It revealed "Reactive urothelial cells, mature squamous cells, mild acute inflammation, and red blood cells present". Pt is uncircumscribed which may account for presence of squamous cells during collection as urine touches the tip of the penis.

## 2024-09-19 NOTE — ASSESSMENT
[FreeTextEntry1] : 2/5/19: The patient returned today for an MRI/ultrasound fusion guided biopsy of the prostate. Was administered an Amikacin injection. Took Valium and Diazepam as directed. Last biopsy was years ago. Notes his urination is adequate. Has been taking Tamsulosin once daily. Inquired about an operation on the prostate. I advised the patient about his options of a laser enucleation or trying other medication. I discouraged the patient from considering a green light laser as the patient's prostate is larger than most that would undergo this procedure. We discussed the risks and benefits of both, as the patient will decide which route he wants to take in the future. Retired. Biopsy: I parted the butt cheeks. Placed the probe in the rectum. Lidocaine jelly used. Measured the prostate volume. Swept from bladder down to the perineum. During the biopsy, there were technical difficulties. After some time, the technical difficulties were fixed and we continued with the procedure. We took four biopsies from the targeted lesion as well as other sampling biopsies. We will send the biopsies to pathology. I advised the patient to drink lots of water and that he should expect blood from his urine and stool, but it will resolve. The patient will return in one week for a follow up with Dr. Steve to discuss prostate biopsy results.  06/17/2020: Patient presents today for a follow up. Pt has moderate pain, but did not think it was serious enough to take pain killers. Has had three Bxs, three MRIs and a TURP in the past. TURP showed prostate tissue was benign. PSA on 09/12/18 was 9.46. On 01/28/2020, the PSA was 8.43. Last prostate Bx on 02/05/19. US today shows and infection of the testicles. US renal today shows epididymitis of the right epididymis, heterogenous in appearance and simple cyst in the head measuring 0.63 cm x 0.58 cm, nonvascular. Hydrocele on the right testes measuring 3.44 x 1.44 x 2.1 cm. On the left epididymis there is a septated cyst measuring 0.65 x 0.60 cm with no vascularity. Bilateral varicocele with vsm measuring 0.31 cm on the right and 0.29 cm on the left. Right side scrotal skin is slightly swollen and red. Pt can touch scrotum without undo pain. Left testicle, epididymis and spermatic cord is non tender, no masses. Hydrocele on right testes which is tender. Right epididymis is more tender. Spermatic cord is also tender. Prescribed Sulfamethoxazole- Trimethoprim BID with food for 2 weeks. Advised pt to avoid exercise and sex while on the pill. Pt will provide a urine sample today for culture, cytology and analysis. Scheduled repeat US testis. RTO in 2 weeks for US renal  07/01/2020: Patient presents today for a follow up. PSA was 8.43 on 01/28/2020. US renal today shows improvement on the right side, but still some vascularity. No stones, tumors or cysts. Pain has gone away with Sulfamethoxazole- Trimethoprim. Reports testicle are tender and of uneven size. Pt had a prostate Bx scheduled, but it has been delayed. MRI prostate on 03/09/2020 showed prostate volume of 22cc and a suspicious lesion in the left anterior peripheral zone with a PIRADS score of 4. Pt reports no artificial parts, doesn't take aspirin, or blood thinners/inhibitor. No medical allergies Pt will provide a urine sample today for culture, cytology and analysis. Bloodwork today will include ALK PHOS, BMP PSA and testosterone. Pt will have transperineal fusion prostate Bx with local anesthesia. Risks and benefits explained. Alternative of having biopsy under anesthesia offered and explained. Alternative of observation without biopsy at this time discussed. If pt is found to have low grade cancer, it will be watched. If it is aggressive, he will have surgical removal of the prostate or radiation. He will take Valium 10 mg 1 hour before the procedure. RTO in 1-4 weeks.  07/28/2020: Patient presents today for a follow up. On 07/01/2020, PSA was 10.70, Creatinine was 1.28, ALK PHOS was 64, and Testosterone was 399. Prostate Bx from 07/20/2020 with one target reveals Ana 4+3=7. No urinary symptoms.   01/26/2021: The pt presents today for a follow up. He has undergone radiation therapy at [facility]. 5 day maximum dosage, TrueBeam radiation. On the 5th day he had some burning and his urination was like it had been before the TURP but those subsided. The pt feels great overall. He has some nocturia 1x but nothing bothersome. Denies any urinary urgency, hematuria, pushing, straining, diarrhea, constipation, chest pains. He has not undergone any hormone treatment after getting multiple opinions. His last PSA from 1/20/2021 was 1.88. He is done seeing the radiation oncologist for now, . He reports he recently had a bone scan ordered by  that he will have the results sent over. He reports that the bone scan was good. He does not got erections anymore but he does not have any interest right now.   05/24/2021: Patient presents today for follow up. Patient states that in October, he started to have burning on urination. He called his oncologist who told him to take Alleve which improved his symptoms, but symptoms returned a week later. Patient presents today to discuss these symptoms. Urination is satisfactory currently. Wakes 0-1x at night to urinate. Denies dysuria, pushing or straining, gross hematuria. Urinates 4-5x during the day. Notes that while he had burning urination, did drip at the end of urination. Notes that after a TURP 4/2018, erections became poor, but not interested in medication. PSA 5/10/21 was 1.15, which is decreased from 10.70 on 7/1/20 s/p TrueBeam radiation. States he had a bone scan on October at [facility] reportedly showing no osseous metastasis.   The patient produced a urine sample which will be sent for urinalysis, urine cytology, and urine culture.  Blood work today included BMP, alkaline phosphatase, PSA, and testosterone.  We suggested a one month visit, but patient declines and prefers to come in 3 months, but agrees to call if he new symptoms develop and needs to come to the office.   05/31/2024: Patient last seen in 2021 and is being followed by Dr. Steve and Dr. Valles. Mr. MENSAH presents today for a follow up audio-visual telehealth visit for which they gave permission for. We utilized [employer] platform. The patient was located at home [address] [place] [address] and I was located in my office in [place], [place]. The 5/24/24 PSA was 0.23. PSA 5/10/21 when he was last seen by me was1.15, which is decreased from 10.70 on 7/1/20 s/p TrueBeam radiation. Receives radiation at [place], with .  Informs me he had to do the TURP twice, but both time resulted in no changes in his diverticula. His diverticulectomy was 3-4 years ago. Currently urinary control is perfect as per patient. Admits to gross hematuria onset 2 months ago and was told it is the stone passing. Nocturia x 2.   We will increase Desmopressin due to Nocturia.  Pt will go to his nearest [facility] lab for blood work including BMP, alkaline phosphatase, PSA, and testosterone and a urine sample which will be sent for urinalysis, urine culture, and urine cytology due to onset gross hematuria.  Pt will schedule a telehealth visit in 3 weeks to review labs. The patient will return to office in October.   07/08/2024: Mr. LUCAS MENSAH presents today for an audio visual telehealth visit for which he gave permission for. The patient was located at home at [address] [place] [address] and I was located at my office in [place], [place]. The patient was quite upset as he was called late at 4:20 PM, 2 hours after his appt time. I apologized for this and advised him to ask for a day that I do not see in person patients when he schedules his appts as I tend to run early/on time when I am just doing telehealth. The patient had lab work done on 7/2/2024. UA revealed 30 mg/dl of protein, trace LE, and 9 WBC/HPF. Urine culture was no significant growth. Urine cytology was negative for high grade urothelial carcinoma. Specimen consists of clusters of urothelial cells, in the background of reactive urothelial cells, and mild acute inflammations. The differential diagnosis includes mechanical disruption (instrumentation, lithiasis, vigorous activity, deep palpation) and papillary urothelial lesion (low-grade or benign). Clinical-pathological-radiological correlation is essential. Creatinine was 0.92. Sodium was normal. Alk phos was normal. Total T was normal at 402, however free testosterone was low at 2.2 pg/mL. Patient denies smoking hx. Does have a hx of radiation for prostate cancer. He feels his vitality is quite good. Is fairly active. His memory is good. He is not really sexually active anymore. Of note, patient will be away for the next two weeks. The patient is not taking desmopressin. Nocturia x0-1. Denies any gross hematuria. Pt does report some intermittent pain at the tip of the penis at the end of urination. Denies redness. Feels his liquid is darker than normal.   Reviewed and discussed lab work of 7/2/2024 in detail with the pt. The patient will provide a new urine specimen for repeat cytology. Patient will be mailed his annotated lab results.  Patient advised to increase water intake to dilute any irritants in his urine which might be causing the pain at the tip of the penis.  Patient will have a telehealth visit after providing his next urine specimen to review and discuss results.   09/16/2024: Mr. MENSAH is a 74 year old male presenting today via telehealth using real time 2 way audio-visual technology. We utilized [employer]. The patient, Mr. MENSAH, was located in his home at [address] [place] [address] at the time of the visit. The provider, IRINEO DICKENS, was located in his office on [place], [place]. Verbal Consent was given by the patient on 09/16/2024 and my scribe served as witness. Pt was very frustrated and agitated after being unable to reach me about his  recent Urine cytology on 7/25/24 which he was concerned about. He reports speaking with the office a few times and was told I would reach back to me urgently. My nurse, my  and myself have no records of the patient's calls.  Pt's Cytology on 7/25/24 was Negative for high-grade urothelial carcinoma. It revealed "Reactive urothelial cells, mature squamous cells, mild acute inflammation, and red blood cells present". Pt is uncircumscribed which may account for presence of squamous cells during collection as urine touches the tip of the penis.   Explained to patient that his urine does not show cells that are suspicious for malignancy.   Also explained to patient that our office only call patients for abnormal results.  If patient is not satisfied with our office, I am agreeable to refer patient to another urologist. He wants to stay with us.  In the future, we will send patients' lab results to him via email.    Preparation, audio-visual visit, and coordination of care took: 30 mins

## 2024-09-19 NOTE — ADDENDUM
[FreeTextEntry1] : This note was partly authored by Jerson Lincoln working as a scribe for IRINEO Mercedes. I, IRINEO Mercedes, have reviewed the content of this note and confirm it is true and accurate. I personally performed the history and physical examination and made all the decisions. 09/16/2024.

## 2024-09-19 NOTE — HISTORY OF PRESENT ILLNESS
[FreeTextEntry1] : 68 year old male presents for evaluation of test results. This pt has been on Flomax for many years .  Nocturia x 1-2  Stream is weak  Occasional intermittency  He had Bx in the past, none of which were targeted. Uroflow 12/13/18: Voided volume of 55ml and PVR of 140ml. Peak flow of 7.1 Repeat PSA marked increase in 2 years. PSA is now 9.46, last result from 12/13/18.  MRI on 01/04/19 reported prostate volume of 95.7 cc and PIRADS-3. We will schedule pt for targeted prostate Bx.  We will do a residual on him after his prostate Bx due to previous residual of 140 mL.   2/5/19: The patient returned today for an MRI/ultrasound fusion guided biopsy of the prostate. Was administered an Amikacin injection. Took Valium and Diazepam as directed. Last biopsy was years ago. Notes his urination is adequate. Has been taking Tamsulosin once daily. Inquired about an operation on the prostate. I advised the patient about his options of a laser enucleation or trying other medication. I discouraged the patient from considering a green light laser as the patient's prostate is larger than most that would undergo this procedure. We discussed the risks and benefits of both, as the patient will decide which route he wants to take in the future. Retired. 06/17/2020: Patient presents today for a follow up. Pt has moderate pain, but did not think it was serious enough to take pain killers. Has had three Bxs, three MRIs and a TURP in the past. TURP showed prostate tissue was benign. PSA on 09/12/18 was 9.46. On 01/28/2020, the PSA was 8.43. Last prostate Bx on 02/05/19. US today shows and infection of the testicles. US renal today shows epididymitis of the right epididymis, heterogenous in appearance and simple cyst in the head measuring 0.63 cm x 0.58 cm, nonvascular. Hydrocele on the right testes measuring 3.44 x 1.44 x 2.1 cm. On the left epididymis there is a septated cyst measuring 0.65 x 0.60 cm with no vascularity. Bilateral varicocele with vsm measuring 0.31 cm on the right and 0.29 cm on the left.   07/01/2020: Patient presents today for a follow up. PSA was 8.43 on 01/28/2020. US renal today shows improvement on the right side, but still some vascularity. No stones, tumors or cysts. Pain has gone away with Sulfamethoxazole- Trimethoprim. Reports testicle are tender and of uneven size. Pt had a prostate Bx scheduled, but it has been delayed. MRI prostate on 03/09/2020 showed prostate volume of 22cc and a suspicious lesion in the left anterior peripheral zone with a PIRADS score of 4. Pt reports no artificial parts, doesn't take aspirin, or blood thinners/inhibitor. No medical allergies   01/26/2021: The pt presents today for a follow up. He has undergone radiation therapy at [facility]. 5 day maximum dosage, TrueBeam radiation. On the 5th day he had some burning and his urination was like it had been before the TURP but those subsided. The pt feels great overall. He has some nocturia 1x but nothing bothersome. Denies any urinary urgency, hematuria, pushing, straining, diarrhea, constipation, chest pains. He has not undergone any hormone treatment after getting multiple opinions. His last PSA from 1/20/2021 was 1.88. He is done seeing the radiation oncologist for now, . He reports he recently had a bone scan  ordered by  that he will have the results sent over. He reports that the bone scan was good. He does not got erections anymore but he does not have any interest right now.   05/24/2021: Patient presents today for follow up. Patient states that in October, he started to have burning on urination. He called his oncologist who told him to take Alleve which improved his symptoms, but symptoms returned a week later. Patient presents today to discuss these symptoms. Urination is satisfactory currently. Wakes 0-1x at night to urinate. Denies dysuria, pushing or straining, gross hematuria. Urinates 4-5x during the day. Notes that while he had burning urination, did drip at the end of urination. Notes that after a TURP 4/2018, erections became poor, but not interested in medication. PSA 5/10/21 was 1.15, which is decreased from 10.70 on 7/1/20 s/p TrueBeam radiation. States he had a bone scan on October at [facility] reportedly showing no osseous metastasis.   05/31/2024: Patient last seen in 2021 and is being followed by Dr. Steve and Dr. Valles. Mr. MENSAH presents today for a follow up audio-visual telehealth visit for which they gave permission for. We utilized [employer] platform. The patient was located at home [address] [place] [address] and I was located in my office in [place], [place]. The 5/24/24 PSA was 0.23. PSA 5/10/21 when he was last seen by me was1.15, which is decreased from 10.70 on 7/1/20 s/p TrueBeam radiation. Receives radiation at [place], with .  Informs me he had to do the TURP twice, but both time resulted in no changes in his diverticula. His diverticulectomy was 3-4 years ago. Currently urinary control is perfect as per patient. Admits to gross hematuria onset 2 months ago and was told it is the stone passing. Nocturia x 2.   07/08/2024: Mr. LUCAS MENSAH presents today for an audio visual telehealth visit for which he gave permission for. The patient was located at home at [address] [place] [address] and I was located at my office in [place], [place]. The patient was quite upset as he was called late at 4:20 PM, 2 hours after his appt time. I apologized for this and advised him to ask for a day that I do not see in person patients when he schedules his appts as I tend to run early/on time when I am just doing telehealth. The patient had lab work done on 7/2/2024. UA revealed 30 mg/dl of protein, trace LE, and 9 WBC/HPF. Urine culture was no significant growth. Urine cytology was negative for high grade urothelial carcinoma. Specimen consists of clusters of urothelial cells, in the background of reactive urothelial cells, and mild acute inflammations. The differential diagnosis includes mechanical disruption (instrumentation, lithiasis, vigorous activity, deep palpation) and papillary urothelial lesion (low-grade or benign). Clinical-pathological-radiological correlation is essential. Creatinine was 0.92. Sodium was normal. Alk phos was normal. Total T was normal at 402, however free testosterone was low at 2.2 pg/mL. Patient denies smoking hx. Does have a hx of radiation for prostate cancer. He feels his vitality is quite good. Is fairly active. His memory is good. He is not really sexually active anymore. Of note, patient will be away for the next two weeks. The patient is not taking desmopressin. Nocturia x0-1. Denies any gross hematuria. Pt does report some intermittent pain at the tip of the penis at the end of urination. Denies redness. Feels his liquid is darker than normal.   09/16/2024: Mr. MENSAH is a 74 year old male presenting today via telehealth using real time 2 way audio-visual technology. We utilized [employer]. The patient, Mr. MENSAH, was located in his home at [address] [place] [address] at the time of the visit. The provider, IRINEO DICKENS, was located in his office on [place], [place]. Verbal Consent was given by the patient on 09/16/2024 and my scribe served as witness. Pt was very frustrated and agitated after being unable to reach me about his  recent Urine cytology on 7/25/24 which he was concerned about. He reports speaking with the office a few times and was told I would reach back to me urgently. My nurse, my  and myself have no records of the patient's calls.  Pt's Cytology on 7/25/24 was Negative for high-grade urothelial carcinoma. It revealed "Reactive urothelial cells, mature squamous cells, mild acute inflammation, and red blood cells present". Pt is uncircumscribed which may account for presence of squamous cells during collection as urine touches the tip of the penis.

## 2024-09-19 NOTE — ADDENDUM
[FreeTextEntry1] : This note was partly authored by Jerson Lincoln working as a scribe for IRINEO Mrecedes. I, IRINEO Mercedes, have reviewed the content of this note and confirm it is true and accurate. I personally performed the history and physical examination and made all the decisions. 09/16/2024.

## 2024-12-12 DIAGNOSIS — Z00.00 ENCOUNTER FOR GENERAL ADULT MEDICAL EXAMINATION W/OUT ABNORMAL FINDINGS: ICD-10-CM

## 2024-12-16 ENCOUNTER — NON-APPOINTMENT (OUTPATIENT)
Age: 74
End: 2024-12-16

## 2024-12-16 ENCOUNTER — APPOINTMENT (OUTPATIENT)
Dept: UROLOGY | Facility: CLINIC | Age: 74
End: 2024-12-16
Payer: MEDICARE

## 2024-12-16 VITALS
RESPIRATION RATE: 17 BRPM | WEIGHT: 190 LBS | HEART RATE: 56 BPM | DIASTOLIC BLOOD PRESSURE: 82 MMHG | TEMPERATURE: 97.5 F | BODY MASS INDEX: 28.14 KG/M2 | SYSTOLIC BLOOD PRESSURE: 136 MMHG | HEIGHT: 69 IN

## 2024-12-16 DIAGNOSIS — N32.3 DIVERTICULUM OF BLADDER: ICD-10-CM

## 2024-12-16 DIAGNOSIS — N40.1 BENIGN PROSTATIC HYPERPLASIA WITH LOWER URINARY TRACT SYMPMS: ICD-10-CM

## 2024-12-16 DIAGNOSIS — N13.8 BENIGN PROSTATIC HYPERPLASIA WITH LOWER URINARY TRACT SYMPMS: ICD-10-CM

## 2024-12-16 LAB
ALP BLD-CCNC: 80 U/L
ANION GAP SERPL CALC-SCNC: 11 MMOL/L
APPEARANCE: CLEAR
BACTERIA UR CULT: NORMAL
BACTERIA: NEGATIVE /HPF
BILIRUBIN URINE: NEGATIVE
BLOOD URINE: NEGATIVE
BUN SERPL-MCNC: 18 MG/DL
CALCIUM SERPL-MCNC: 9.6 MG/DL
CAST: 0 /LPF
CHLORIDE SERPL-SCNC: 103 MMOL/L
CO2 SERPL-SCNC: 26 MMOL/L
COLOR: YELLOW
CREAT SERPL-MCNC: 0.99 MG/DL
EGFR: 79 ML/MIN/1.73M2
EPITHELIAL CELLS: 1 /HPF
GLUCOSE QUALITATIVE U: NEGATIVE MG/DL
GLUCOSE SERPL-MCNC: 129 MG/DL
KETONES URINE: NEGATIVE MG/DL
LEUKOCYTE ESTERASE URINE: ABNORMAL
MICROSCOPIC-UA: NORMAL
NITRITE URINE: NEGATIVE
PH URINE: 6
POTASSIUM SERPL-SCNC: 4.2 MMOL/L
PROTEIN URINE: NORMAL MG/DL
PSA FREE FLD-MCNC: 9 %
PSA FREE SERPL-MCNC: 0.02 NG/ML
PSA SERPL-MCNC: 0.24 NG/ML
RED BLOOD CELLS URINE: 2 /HPF
SODIUM SERPL-SCNC: 141 MMOL/L
SPECIFIC GRAVITY URINE: 1.02
TESTOST FREE SERPL-MCNC: 5.7 PG/ML
TESTOST SERPL-MCNC: 392 NG/DL
UROBILINOGEN URINE: 0.2 MG/DL
WHITE BLOOD CELLS URINE: 4 /HPF

## 2024-12-16 PROCEDURE — 99214 OFFICE O/P EST MOD 30 MIN: CPT

## 2024-12-20 LAB — URINE CYTOLOGY: NORMAL

## (undated) DEVICE — PACK CYSTO

## (undated) DEVICE — SPECIMEN CONTAINER 100ML

## (undated) DEVICE — IRR BULB PATHFINDER + 10"

## (undated) DEVICE — POSITIONER FOAM HEADREST (PINK)

## (undated) DEVICE — ACMI SELF-SEALING SEAL UP TO 7FR

## (undated) DEVICE — TUBING RANGER FLUID IRRIGATION SET DISP

## (undated) DEVICE — GLV 8 PROTEXIS (WHITE)

## (undated) DEVICE — DRAPE EQUIPMENT BANDED BAG 30 X 30" (SHOWER CAP)

## (undated) DEVICE — MEDICATION LABELS W MARKER

## (undated) DEVICE — ELCTR PLASMA NEEDLE ANGLED 24FR 12-30 DEG

## (undated) DEVICE — SOL IRR BAG H2O 3000ML

## (undated) DEVICE — GLV 6.5 PROTEXIS (WHITE)

## (undated) DEVICE — GLV 7.5 PROTEXIS (WHITE)

## (undated) DEVICE — GLV 7 PROTEXIS (WHITE)

## (undated) DEVICE — POSITIONER FOAM EGG CRATE ULNAR 2PCS (PINK)

## (undated) DEVICE — WARMING BLANKET UPPER ADULT

## (undated) DEVICE — DRAPE LEGGINGS XL

## (undated) DEVICE — SYR LUER LOK 10CC

## (undated) DEVICE — BAG URINE W METER 2L

## (undated) DEVICE — VENODYNE/SCD SLEEVE CALF LARGE

## (undated) DEVICE — FOLEY HOLDER STATLOCK 2 WAY ADULT

## (undated) DEVICE — GOWN TRIMAX LG

## (undated) DEVICE — PRESSURE INFUSOR BAG 3000ML

## (undated) DEVICE — ELCTR PLASMA LOOP MEDIUM ANGLED 24FR 12-30 DEG

## (undated) DEVICE — SOL IRR BAG NS 0.9% 3000ML

## (undated) DEVICE — FOLEY CATH 2-WAY 22FR 30CC SILICONE

## (undated) DEVICE — PREP BETADINE KIT

## (undated) DEVICE — ELCTR PLASMA BUTTON OVAL 24FR 12-30 DEG

## (undated) DEVICE — SOL IRR POUR H2O 1500ML

## (undated) DEVICE — SYR ASEPTO